# Patient Record
Sex: FEMALE | Race: BLACK OR AFRICAN AMERICAN | NOT HISPANIC OR LATINO | Employment: FULL TIME | URBAN - METROPOLITAN AREA
[De-identification: names, ages, dates, MRNs, and addresses within clinical notes are randomized per-mention and may not be internally consistent; named-entity substitution may affect disease eponyms.]

---

## 2021-02-10 ENCOUNTER — TELEPHONE (OUTPATIENT)
Dept: FAMILY MEDICINE | Facility: CLINIC | Age: 31
End: 2021-02-10

## 2021-02-17 ENCOUNTER — OFFICE VISIT (OUTPATIENT)
Dept: FAMILY MEDICINE | Facility: CLINIC | Age: 31
End: 2021-02-17
Payer: OTHER GOVERNMENT

## 2021-02-17 ENCOUNTER — TELEPHONE (OUTPATIENT)
Dept: FAMILY MEDICINE | Facility: CLINIC | Age: 31
End: 2021-02-17

## 2021-02-17 VITALS
HEIGHT: 65 IN | HEART RATE: 61 BPM | BODY MASS INDEX: 30.49 KG/M2 | WEIGHT: 183 LBS | DIASTOLIC BLOOD PRESSURE: 70 MMHG | SYSTOLIC BLOOD PRESSURE: 110 MMHG

## 2021-02-17 DIAGNOSIS — Z00.00 ROUTINE GENERAL MEDICAL EXAMINATION AT A HEALTH CARE FACILITY: Primary | ICD-10-CM

## 2021-02-17 DIAGNOSIS — D21.9 FIBROIDS: ICD-10-CM

## 2021-02-17 DIAGNOSIS — K21.9 GASTROESOPHAGEAL REFLUX DISEASE, UNSPECIFIED WHETHER ESOPHAGITIS PRESENT: ICD-10-CM

## 2021-02-17 PROCEDURE — 99385 PREV VISIT NEW AGE 18-39: CPT | Mod: S$GLB,,, | Performed by: PHYSICIAN ASSISTANT

## 2021-02-17 PROCEDURE — 99385 PR PREVENTIVE VISIT,NEW,18-39: ICD-10-PCS | Mod: S$GLB,,, | Performed by: PHYSICIAN ASSISTANT

## 2021-02-17 RX ORDER — PANTOPRAZOLE SODIUM 40 MG/1
40 TABLET, DELAYED RELEASE ORAL DAILY
Qty: 30 TABLET | Refills: 11 | Status: SHIPPED | OUTPATIENT
Start: 2021-02-17 | End: 2021-03-29 | Stop reason: SDUPTHER

## 2021-02-20 ENCOUNTER — PATIENT MESSAGE (OUTPATIENT)
Dept: FAMILY MEDICINE | Facility: CLINIC | Age: 31
End: 2021-02-20

## 2021-02-20 LAB
ALBUMIN SERPL-MCNC: 4.8 G/DL (ref 3.6–5.1)
ALBUMIN/GLOB SERPL: 1.7 (CALC) (ref 1–2.5)
ALP SERPL-CCNC: 69 U/L (ref 31–125)
ALT SERPL-CCNC: 14 U/L (ref 6–29)
APPEARANCE UR: CLEAR
AST SERPL-CCNC: 21 U/L (ref 10–30)
BACTERIA #/AREA URNS HPF: ABNORMAL /HPF
BACTERIA UR CULT: ABNORMAL
BACTERIA UR CULT: ABNORMAL
BASOPHILS # BLD AUTO: 32 CELLS/UL (ref 0–200)
BASOPHILS NFR BLD AUTO: 0.4 %
BILIRUB SERPL-MCNC: 0.6 MG/DL (ref 0.2–1.2)
BILIRUB UR QL STRIP: NEGATIVE
BUN SERPL-MCNC: 14 MG/DL (ref 7–25)
BUN/CREAT SERPL: ABNORMAL (CALC) (ref 6–22)
CALCIUM SERPL-MCNC: 10.3 MG/DL (ref 8.6–10.2)
CHLORIDE SERPL-SCNC: 99 MMOL/L (ref 98–110)
CHOLEST SERPL-MCNC: 177 MG/DL
CHOLEST/HDLC SERPL: 3.2 (CALC)
CO2 SERPL-SCNC: 29 MMOL/L (ref 20–32)
COLOR UR: YELLOW
CREAT SERPL-MCNC: 0.86 MG/DL (ref 0.5–1.1)
EOSINOPHIL # BLD AUTO: 40 CELLS/UL (ref 15–500)
EOSINOPHIL NFR BLD AUTO: 0.5 %
ERYTHROCYTE [DISTWIDTH] IN BLOOD BY AUTOMATED COUNT: 12.4 % (ref 11–15)
GFRSERPLBLD MDRD-ARVRAT: 91 ML/MIN/1.73M2
GLOBULIN SER CALC-MCNC: 2.8 G/DL (CALC) (ref 1.9–3.7)
GLUCOSE SERPL-MCNC: 80 MG/DL (ref 65–99)
GLUCOSE UR QL STRIP: NEGATIVE
HCT VFR BLD AUTO: 36.8 % (ref 35–45)
HDLC SERPL-MCNC: 56 MG/DL
HGB BLD-MCNC: 12.6 G/DL (ref 11.7–15.5)
HGB UR QL STRIP: NEGATIVE
HYALINE CASTS #/AREA URNS LPF: ABNORMAL /LPF
KETONES UR QL STRIP: NEGATIVE
LDLC SERPL CALC-MCNC: 102 MG/DL (CALC)
LEUKOCYTE ESTERASE UR QL STRIP: ABNORMAL
LYMPHOCYTES # BLD AUTO: 2007 CELLS/UL (ref 850–3900)
LYMPHOCYTES NFR BLD AUTO: 25.4 %
MCH RBC QN AUTO: 31.6 PG (ref 27–33)
MCHC RBC AUTO-ENTMCNC: 34.2 G/DL (ref 32–36)
MCV RBC AUTO: 92.2 FL (ref 80–100)
MONOCYTES # BLD AUTO: 387 CELLS/UL (ref 200–950)
MONOCYTES NFR BLD AUTO: 4.9 %
NEUTROPHILS # BLD AUTO: 5435 CELLS/UL (ref 1500–7800)
NEUTROPHILS NFR BLD AUTO: 68.8 %
NITRITE UR QL STRIP: NEGATIVE
NONHDLC SERPL-MCNC: 121 MG/DL (CALC)
PH UR STRIP: 5.5 [PH] (ref 5–8)
PLATELET # BLD AUTO: 230 THOUSAND/UL (ref 140–400)
PMV BLD REES-ECKER: 13.1 FL (ref 7.5–12.5)
POTASSIUM SERPL-SCNC: 4.2 MMOL/L (ref 3.5–5.3)
PROT SERPL-MCNC: 7.6 G/DL (ref 6.1–8.1)
PROT UR QL STRIP: NEGATIVE
RBC # BLD AUTO: 3.99 MILLION/UL (ref 3.8–5.1)
RBC #/AREA URNS HPF: ABNORMAL /HPF
SODIUM SERPL-SCNC: 137 MMOL/L (ref 135–146)
SP GR UR STRIP: 1.02 (ref 1–1.03)
SQUAMOUS #/AREA URNS HPF: ABNORMAL /HPF
TRIGL SERPL-MCNC: 99 MG/DL
TSH SERPL-ACNC: 1.42 MIU/L
WBC # BLD AUTO: 7.9 THOUSAND/UL (ref 3.8–10.8)
WBC #/AREA URNS HPF: ABNORMAL /HPF

## 2021-02-22 ENCOUNTER — PATIENT MESSAGE (OUTPATIENT)
Dept: FAMILY MEDICINE | Facility: CLINIC | Age: 31
End: 2021-02-22

## 2021-03-02 ENCOUNTER — PATIENT MESSAGE (OUTPATIENT)
Dept: FAMILY MEDICINE | Facility: CLINIC | Age: 31
End: 2021-03-02

## 2021-03-24 DIAGNOSIS — R10.2 PELVIC AND PERINEAL PAIN: Primary | ICD-10-CM

## 2021-03-25 ENCOUNTER — TELEPHONE (OUTPATIENT)
Dept: FAMILY MEDICINE | Facility: CLINIC | Age: 31
End: 2021-03-25

## 2021-03-29 ENCOUNTER — OFFICE VISIT (OUTPATIENT)
Dept: FAMILY MEDICINE | Facility: CLINIC | Age: 31
End: 2021-03-29
Payer: OTHER GOVERNMENT

## 2021-03-29 VITALS
HEART RATE: 64 BPM | DIASTOLIC BLOOD PRESSURE: 86 MMHG | WEIGHT: 180 LBS | HEIGHT: 65 IN | SYSTOLIC BLOOD PRESSURE: 120 MMHG | BODY MASS INDEX: 29.99 KG/M2

## 2021-03-29 DIAGNOSIS — K21.9 GASTROESOPHAGEAL REFLUX DISEASE, UNSPECIFIED WHETHER ESOPHAGITIS PRESENT: ICD-10-CM

## 2021-03-29 DIAGNOSIS — K21.9 GASTROESOPHAGEAL REFLUX DISEASE, UNSPECIFIED WHETHER ESOPHAGITIS PRESENT: Primary | ICD-10-CM

## 2021-03-29 PROCEDURE — 99213 OFFICE O/P EST LOW 20 MIN: CPT | Mod: S$GLB,,, | Performed by: PHYSICIAN ASSISTANT

## 2021-03-29 PROCEDURE — 99213 PR OFFICE/OUTPT VISIT, EST, LEVL III, 20-29 MIN: ICD-10-PCS | Mod: S$GLB,,, | Performed by: PHYSICIAN ASSISTANT

## 2021-03-29 RX ORDER — PANTOPRAZOLE SODIUM 40 MG/1
40 TABLET, DELAYED RELEASE ORAL DAILY
Qty: 30 TABLET | Refills: 5 | Status: SHIPPED | OUTPATIENT
Start: 2021-03-29 | End: 2021-08-20 | Stop reason: SDUPTHER

## 2021-04-01 ENCOUNTER — TELEPHONE (OUTPATIENT)
Dept: FAMILY MEDICINE | Facility: CLINIC | Age: 31
End: 2021-04-01

## 2021-04-01 ENCOUNTER — PATIENT MESSAGE (OUTPATIENT)
Dept: FAMILY MEDICINE | Facility: CLINIC | Age: 31
End: 2021-04-01

## 2021-04-01 DIAGNOSIS — R87.610 PAP SMEAR ABNORMALITY OF CERVIX WITH ASCUS FAVORING BENIGN: ICD-10-CM

## 2021-04-01 DIAGNOSIS — R87.610 ATYPICAL SQUAMOUS CELL OF UNDETERMINED SIGNIFICANCE OF CERVIX: Primary | ICD-10-CM

## 2021-06-01 ENCOUNTER — OFFICE VISIT (OUTPATIENT)
Dept: FAMILY MEDICINE | Facility: CLINIC | Age: 31
End: 2021-06-01
Payer: OTHER GOVERNMENT

## 2021-06-01 VITALS
WEIGHT: 172 LBS | HEART RATE: 88 BPM | SYSTOLIC BLOOD PRESSURE: 122 MMHG | BODY MASS INDEX: 28.62 KG/M2 | OXYGEN SATURATION: 99 % | DIASTOLIC BLOOD PRESSURE: 80 MMHG

## 2021-06-01 DIAGNOSIS — N34.1 URETHRITIS, NONSPECIFIC: ICD-10-CM

## 2021-06-01 DIAGNOSIS — B37.31 VAGINAL YEAST INFECTION: ICD-10-CM

## 2021-06-01 DIAGNOSIS — Z20.2 POSSIBLE EXPOSURE TO STD: Primary | ICD-10-CM

## 2021-06-01 PROCEDURE — 99213 PR OFFICE/OUTPT VISIT, EST, LEVL III, 20-29 MIN: ICD-10-PCS | Mod: 25,S$GLB,, | Performed by: PHYSICIAN ASSISTANT

## 2021-06-01 PROCEDURE — 96372 PR INJECTION,THERAP/PROPH/DIAG2ST, IM OR SUBCUT: ICD-10-PCS | Mod: S$GLB,,, | Performed by: PHYSICIAN ASSISTANT

## 2021-06-01 PROCEDURE — 99213 OFFICE O/P EST LOW 20 MIN: CPT | Mod: 25,S$GLB,, | Performed by: PHYSICIAN ASSISTANT

## 2021-06-01 PROCEDURE — 96372 THER/PROPH/DIAG INJ SC/IM: CPT | Mod: S$GLB,,, | Performed by: PHYSICIAN ASSISTANT

## 2021-06-01 RX ORDER — DOXYCYCLINE 100 MG/1
100 CAPSULE ORAL 2 TIMES DAILY
Qty: 20 CAPSULE | Refills: 0 | Status: SHIPPED | OUTPATIENT
Start: 2021-06-01 | End: 2021-06-11

## 2021-06-01 RX ORDER — CEFTRIAXONE 500 MG/1
500 INJECTION, POWDER, FOR SOLUTION INTRAMUSCULAR; INTRAVENOUS
Status: DISCONTINUED | OUTPATIENT
Start: 2021-06-01 | End: 2021-06-01

## 2021-06-01 RX ORDER — CEFTRIAXONE 1 G/1
1 INJECTION, POWDER, FOR SOLUTION INTRAMUSCULAR; INTRAVENOUS ONCE
Status: COMPLETED | OUTPATIENT
Start: 2021-06-01 | End: 2021-06-01

## 2021-06-01 RX ADMIN — CEFTRIAXONE 1 G: 1 INJECTION, POWDER, FOR SOLUTION INTRAMUSCULAR; INTRAVENOUS at 04:06

## 2021-06-03 LAB
C TRACH RRNA SPEC QL NAA+PROBE: NOT DETECTED
COMMENT: NORMAL
HIV 1+2 AB+HIV1 P24 AG SERPL QL IA: NORMAL
N GONORRHOEA RRNA SPEC QL NAA+PROBE: NOT DETECTED
RPR SER QL: NORMAL

## 2021-06-04 LAB
HSV1 IGM SER QL IF: NEGATIVE
HSV2 IGM SER QL IF: NEGATIVE

## 2021-08-20 ENCOUNTER — OFFICE VISIT (OUTPATIENT)
Dept: FAMILY MEDICINE | Facility: CLINIC | Age: 31
End: 2021-08-20
Payer: OTHER GOVERNMENT

## 2021-08-20 VITALS
DIASTOLIC BLOOD PRESSURE: 78 MMHG | SYSTOLIC BLOOD PRESSURE: 120 MMHG | BODY MASS INDEX: 29.66 KG/M2 | WEIGHT: 178 LBS | HEART RATE: 80 BPM | HEIGHT: 65 IN

## 2021-08-20 DIAGNOSIS — F39 MOOD DISORDER: ICD-10-CM

## 2021-08-20 DIAGNOSIS — N89.8 VAGINAL DISCHARGE: ICD-10-CM

## 2021-08-20 DIAGNOSIS — K21.9 GASTROESOPHAGEAL REFLUX DISEASE, UNSPECIFIED WHETHER ESOPHAGITIS PRESENT: Primary | ICD-10-CM

## 2021-08-20 PROCEDURE — 99214 OFFICE O/P EST MOD 30 MIN: CPT | Mod: S$GLB,,, | Performed by: PHYSICIAN ASSISTANT

## 2021-08-20 PROCEDURE — 99214 PR OFFICE/OUTPT VISIT, EST, LEVL IV, 30-39 MIN: ICD-10-PCS | Mod: S$GLB,,, | Performed by: PHYSICIAN ASSISTANT

## 2021-08-20 RX ORDER — FLUCONAZOLE 150 MG/1
150 TABLET ORAL DAILY
Qty: 3 TABLET | Refills: 0 | Status: SHIPPED | OUTPATIENT
Start: 2021-08-20 | End: 2021-08-23

## 2021-08-20 RX ORDER — PANTOPRAZOLE SODIUM 40 MG/1
40 TABLET, DELAYED RELEASE ORAL DAILY
Qty: 30 TABLET | Refills: 5 | Status: SHIPPED | OUTPATIENT
Start: 2021-08-20 | End: 2022-08-22 | Stop reason: SDUPTHER

## 2021-08-23 LAB
APPEARANCE UR: CLEAR
BACTERIA #/AREA URNS HPF: ABNORMAL /HPF
BACTERIA UR CULT: ABNORMAL
BILIRUB UR QL STRIP: NEGATIVE
C TRACH RRNA SPEC QL NAA+PROBE: NOT DETECTED
COLOR UR: YELLOW
COMMENT: NORMAL
GLUCOSE UR QL STRIP: NEGATIVE
HGB UR QL STRIP: NEGATIVE
HIV 1+2 AB+HIV1 P24 AG SERPL QL IA: NORMAL
HYALINE CASTS #/AREA URNS LPF: ABNORMAL /LPF
KETONES UR QL STRIP: NEGATIVE
LEUKOCYTE ESTERASE UR QL STRIP: NEGATIVE
N GONORRHOEA RRNA SPEC QL NAA+PROBE: NOT DETECTED
NITRITE UR QL STRIP: NEGATIVE
PH UR STRIP: ABNORMAL [PH] (ref 5–8)
PROT UR QL STRIP: NEGATIVE
RBC #/AREA URNS HPF: ABNORMAL /HPF
RPR SER QL: NORMAL
SP GR UR STRIP: 1.01 (ref 1–1.03)
SQUAMOUS #/AREA URNS HPF: ABNORMAL /HPF
WBC #/AREA URNS HPF: ABNORMAL /HPF

## 2021-09-17 ENCOUNTER — TELEPHONE (OUTPATIENT)
Dept: FAMILY MEDICINE | Facility: CLINIC | Age: 31
End: 2021-09-17

## 2021-10-08 ENCOUNTER — TELEPHONE (OUTPATIENT)
Dept: FAMILY MEDICINE | Facility: CLINIC | Age: 31
End: 2021-10-08

## 2021-10-08 DIAGNOSIS — F39 MOOD DISORDER: Primary | ICD-10-CM

## 2021-12-06 ENCOUNTER — PATIENT MESSAGE (OUTPATIENT)
Dept: FAMILY MEDICINE | Facility: CLINIC | Age: 31
End: 2021-12-06
Payer: OTHER GOVERNMENT

## 2021-12-06 DIAGNOSIS — B37.31 VAGINAL YEAST INFECTION: Primary | ICD-10-CM

## 2021-12-06 RX ORDER — FLUCONAZOLE 150 MG/1
150 TABLET ORAL DAILY
Qty: 3 TABLET | Refills: 0 | Status: SHIPPED | OUTPATIENT
Start: 2021-12-06 | End: 2021-12-09

## 2021-12-28 ENCOUNTER — TELEPHONE (OUTPATIENT)
Dept: FAMILY MEDICINE | Facility: CLINIC | Age: 31
End: 2021-12-28
Payer: OTHER GOVERNMENT

## 2021-12-30 ENCOUNTER — OFFICE VISIT (OUTPATIENT)
Dept: FAMILY MEDICINE | Facility: CLINIC | Age: 31
End: 2021-12-30
Payer: OTHER GOVERNMENT

## 2021-12-30 VITALS
WEIGHT: 184 LBS | SYSTOLIC BLOOD PRESSURE: 108 MMHG | BODY MASS INDEX: 30.66 KG/M2 | HEART RATE: 88 BPM | HEIGHT: 65 IN | DIASTOLIC BLOOD PRESSURE: 60 MMHG

## 2021-12-30 DIAGNOSIS — M25.532 LEFT WRIST PAIN: ICD-10-CM

## 2021-12-30 DIAGNOSIS — I34.1 MVP (MITRAL VALVE PROLAPSE): ICD-10-CM

## 2021-12-30 DIAGNOSIS — R30.0 DYSURIA: Primary | ICD-10-CM

## 2021-12-30 DIAGNOSIS — R00.2 PALPITATIONS: ICD-10-CM

## 2021-12-30 PROCEDURE — 99214 PR OFFICE/OUTPT VISIT, EST, LEVL IV, 30-39 MIN: ICD-10-PCS | Mod: S$GLB,,, | Performed by: PHYSICIAN ASSISTANT

## 2021-12-30 PROCEDURE — 99214 OFFICE O/P EST MOD 30 MIN: CPT | Mod: S$GLB,,, | Performed by: PHYSICIAN ASSISTANT

## 2021-12-31 LAB
ALBUMIN SERPL-MCNC: 4.9 G/DL (ref 3.6–5.1)
ALBUMIN/GLOB SERPL: 1.6 (CALC) (ref 1–2.5)
ALP SERPL-CCNC: 57 U/L (ref 31–125)
ALT SERPL-CCNC: 20 U/L (ref 6–29)
APPEARANCE UR: CLEAR
AST SERPL-CCNC: 26 U/L (ref 10–30)
BACTERIA #/AREA URNS HPF: ABNORMAL /HPF
BACTERIA UR CULT: ABNORMAL
BASOPHILS # BLD AUTO: 44 CELLS/UL (ref 0–200)
BASOPHILS NFR BLD AUTO: 0.5 %
BILIRUB SERPL-MCNC: 0.5 MG/DL (ref 0.2–1.2)
BILIRUB UR QL STRIP: NEGATIVE
BUN SERPL-MCNC: 20 MG/DL (ref 7–25)
BUN/CREAT SERPL: ABNORMAL (CALC) (ref 6–22)
CALCIUM SERPL-MCNC: 10.4 MG/DL (ref 8.6–10.2)
CHLORIDE SERPL-SCNC: 99 MMOL/L (ref 98–110)
CO2 SERPL-SCNC: 29 MMOL/L (ref 20–32)
COLOR UR: YELLOW
CREAT SERPL-MCNC: 0.86 MG/DL (ref 0.5–1.1)
EOSINOPHIL # BLD AUTO: 70 CELLS/UL (ref 15–500)
EOSINOPHIL NFR BLD AUTO: 0.8 %
ERYTHROCYTE [DISTWIDTH] IN BLOOD BY AUTOMATED COUNT: 12.7 % (ref 11–15)
GLOBULIN SER CALC-MCNC: 3 G/DL (CALC) (ref 1.9–3.7)
GLUCOSE SERPL-MCNC: 79 MG/DL (ref 65–99)
GLUCOSE UR QL STRIP: ABNORMAL
HCT VFR BLD AUTO: 38.5 % (ref 35–45)
HGB BLD-MCNC: 12.9 G/DL (ref 11.7–15.5)
HGB UR QL STRIP: NEGATIVE
HIV 1+2 AB+HIV1 P24 AG SERPL QL IA: NORMAL
HYALINE CASTS #/AREA URNS LPF: ABNORMAL /LPF
KETONES UR QL STRIP: NEGATIVE
LEUKOCYTE ESTERASE UR QL STRIP: NEGATIVE
LYMPHOCYTES # BLD AUTO: 2306 CELLS/UL (ref 850–3900)
LYMPHOCYTES NFR BLD AUTO: 26.2 %
MCH RBC QN AUTO: 31.1 PG (ref 27–33)
MCHC RBC AUTO-ENTMCNC: 33.5 G/DL (ref 32–36)
MCV RBC AUTO: 92.8 FL (ref 80–100)
MONOCYTES # BLD AUTO: 466 CELLS/UL (ref 200–950)
MONOCYTES NFR BLD AUTO: 5.3 %
NEUTROPHILS # BLD AUTO: 5914 CELLS/UL (ref 1500–7800)
NEUTROPHILS NFR BLD AUTO: 67.2 %
NITRITE UR QL STRIP: NEGATIVE
PH UR STRIP: 6 [PH] (ref 5–8)
PLATELET # BLD AUTO: 231 THOUSAND/UL (ref 140–400)
PMV BLD REES-ECKER: 11.9 FL (ref 7.5–12.5)
POTASSIUM SERPL-SCNC: 4.2 MMOL/L (ref 3.5–5.3)
PROT SERPL-MCNC: 7.9 G/DL (ref 6.1–8.1)
PROT UR QL STRIP: NEGATIVE
RBC # BLD AUTO: 4.15 MILLION/UL (ref 3.8–5.1)
RBC #/AREA URNS HPF: ABNORMAL /HPF
RPR SER QL: NORMAL
SODIUM SERPL-SCNC: 136 MMOL/L (ref 135–146)
SP GR UR STRIP: 1.02 (ref 1–1.03)
SQUAMOUS #/AREA URNS HPF: ABNORMAL /HPF
TSH SERPL-ACNC: 1.97 MIU/L
WBC # BLD AUTO: 8.8 THOUSAND/UL (ref 3.8–10.8)
WBC #/AREA URNS HPF: ABNORMAL /HPF

## 2022-01-03 ENCOUNTER — PATIENT MESSAGE (OUTPATIENT)
Dept: FAMILY MEDICINE | Facility: CLINIC | Age: 32
End: 2022-01-03
Payer: OTHER GOVERNMENT

## 2022-02-21 ENCOUNTER — OFFICE VISIT (OUTPATIENT)
Dept: FAMILY MEDICINE | Facility: CLINIC | Age: 32
End: 2022-02-21
Payer: OTHER GOVERNMENT

## 2022-02-21 VITALS
HEART RATE: 80 BPM | DIASTOLIC BLOOD PRESSURE: 62 MMHG | BODY MASS INDEX: 30.16 KG/M2 | WEIGHT: 181 LBS | OXYGEN SATURATION: 98 % | SYSTOLIC BLOOD PRESSURE: 100 MMHG | HEIGHT: 65 IN

## 2022-02-21 DIAGNOSIS — R81 GLUCOSURIA WITH NORMAL SERUM GLUCOSE: ICD-10-CM

## 2022-02-21 DIAGNOSIS — M25.532 LEFT WRIST PAIN: ICD-10-CM

## 2022-02-21 DIAGNOSIS — E83.52 SERUM CALCIUM ELEVATED: ICD-10-CM

## 2022-02-21 DIAGNOSIS — J02.9 ACUTE VIRAL PHARYNGITIS: Primary | ICD-10-CM

## 2022-02-21 DIAGNOSIS — K21.9 GASTROESOPHAGEAL REFLUX DISEASE, UNSPECIFIED WHETHER ESOPHAGITIS PRESENT: ICD-10-CM

## 2022-02-21 PROCEDURE — 99395 PR PREVENTIVE VISIT,EST,18-39: ICD-10-PCS | Mod: S$GLB,,, | Performed by: PHYSICIAN ASSISTANT

## 2022-02-21 PROCEDURE — 99395 PREV VISIT EST AGE 18-39: CPT | Mod: S$GLB,,, | Performed by: PHYSICIAN ASSISTANT

## 2022-02-21 NOTE — PROGRESS NOTES
SUBJECTIVE:    Patient ID: Estrella Corrales is a 31 y.o. female.    Chief Complaint: Annual Exam (Annual wellness exam//no med bottles//tc)    Patient is a 30 y/o female here today for an annual exam. Today she reports feeling mildly under the weather with symptoms including a sore throat, congestion, clear rhinorrhea, productive cough, sneezing. This has been going on for a week. Feels this is related to the weather changes. No sick contacts. No fever, sinus pressure/pain. Feels her acid reflux has been exacerbated recently b/c she start the keto diet. Has not been taking her protonix daily. Previous visit she was having carpal tunnel pain, thumb spica splint has managed this very well.     Worried about glucose in urine and minimally elevated calcium on CMP. Reassured patient, will recheck labs in 6 months..           Office Visit on 12/30/2021   Component Date Value Ref Range Status    Color, UA 12/30/2021 YELLOW  YELLOW Final    Appearance, UA 12/30/2021 CLEAR  CLEAR Final    Specific [place], UA 12/30/2021 1.017  1.001 - 1.035 Final    pH, UA 12/30/2021 6.0  5.0 - 8.0 Final    Glucose, UA 12/30/2021 1+ (A) NEGATIVE Final    Bilirubin, UA 12/30/2021 NEGATIVE  NEGATIVE Final    Ketones, UA 12/30/2021 NEGATIVE  NEGATIVE Final    Occult Blood UA 12/30/2021 NEGATIVE  NEGATIVE Final    Protein, UA 12/30/2021 NEGATIVE  NEGATIVE Final    Nitrite, UA 12/30/2021 NEGATIVE  NEGATIVE Final    Leukocytes, UA 12/30/2021 NEGATIVE  NEGATIVE Final    WBC Casts, UA 12/30/2021 NONE SEEN  < OR = 5 /HPF Final    RBC Casts, UA 12/30/2021 NONE SEEN  < OR = 2 /HPF Final    Squam Epithel, UA 12/30/2021 NONE SEEN  < OR = 5 /HPF Final    Bacteria, UA 12/30/2021 NONE SEEN  NONE SEEN /HPF Final    Hyaline Casts, UA 12/30/2021 NONE SEEN  NONE SEEN /LPF Final    Reflexive Urine Culture 12/30/2021    Final    HIV Ag/Ab 4th Gen 12/30/2021 NON-REACTIVE  NON-REACTIVE Final    WBC 12/30/2021 8.8  3.8 - 10.8 Thousand/uL Final     RBC 12/30/2021 4.15  3.80 - 5.10 Million/uL Final    Hemoglobin 12/30/2021 12.9  11.7 - 15.5 g/dL Final    Hematocrit 12/30/2021 38.5  35.0 - 45.0 % Final    MCV 12/30/2021 92.8  80.0 - 100.0 fL Final    MCH 12/30/2021 31.1  27.0 - 33.0 pg Final    MCHC 12/30/2021 33.5  32.0 - 36.0 g/dL Final    RDW 12/30/2021 12.7  11.0 - 15.0 % Final    Platelets 12/30/2021 231  140 - 400 Thousand/uL Final    MPV 12/30/2021 11.9  7.5 - 12.5 fL Final    Neutrophils, Abs 12/30/2021 5,914  1,500 - 7,800 cells/uL Final    Lymph # 12/30/2021 2,306  850 - 3,900 cells/uL Final    Mono # 12/30/2021 466  200 - 950 cells/uL Final    Eos # 12/30/2021 70  15 - 500 cells/uL Final    Baso # 12/30/2021 44  0 - 200 cells/uL Final    Neutrophils Relative 12/30/2021 67.2  % Final    Lymph % 12/30/2021 26.2  % Final    Mono % 12/30/2021 5.3  % Final    Eosinophil % 12/30/2021 0.8  % Final    Basophil % 12/30/2021 0.5  % Final    Glucose 12/30/2021 79  65 - 99 mg/dL Final    BUN 12/30/2021 20  7 - 25 mg/dL Final    Creatinine 12/30/2021 0.86  0.50 - 1.10 mg/dL Final    eGFR if non African American 12/30/2021 90  > OR = 60 mL/min/1.73m2 Final    eGFR if  12/30/2021 104  > OR = 60 mL/min/1.73m2 Final    BUN/Creatinine Ratio 12/30/2021 NOT APPLICABLE  6 - 22 (calc) Final    Sodium 12/30/2021 136  135 - 146 mmol/L Final    Potassium 12/30/2021 4.2  3.5 - 5.3 mmol/L Final    Chloride 12/30/2021 99  98 - 110 mmol/L Final    CO2 12/30/2021 29  20 - 32 mmol/L Final    Calcium 12/30/2021 10.4 (A) 8.6 - 10.2 mg/dL Final    Total Protein 12/30/2021 7.9  6.1 - 8.1 g/dL Final    Albumin 12/30/2021 4.9  3.6 - 5.1 g/dL Final    Globulin, Total 12/30/2021 3.0  1.9 - 3.7 g/dL (calc) Final    Albumin/Globulin Ratio 12/30/2021 1.6  1.0 - 2.5 (calc) Final    Total Bilirubin 12/30/2021 0.5  0.2 - 1.2 mg/dL Final    Alkaline Phosphatase 12/30/2021 57  31 - 125 U/L Final    AST 12/30/2021 26  10 - 30 U/L Final    ALT  "12/30/2021 20  6 - 29 U/L Final    TSH w/reflex to FT4 12/30/2021 1.97  mIU/L Final    RPR (Dx) w/Refl Titer and Confirma* 12/30/2021 NON-REACTIVE  NON-REACTIVE Final       Past Medical History:   Diagnosis Date    GERD (gastroesophageal reflux disease)      History reviewed. No pertinent surgical history.  Family History   Problem Relation Age of Onset    Hypertension Mother     Kidney disease Mother     Cancer Maternal Grandmother        Marital Status: Single  Alcohol History:  reports current alcohol use of about 1.0 standard drink of alcohol per week.  Tobacco History:  reports that she has never smoked. She has never used smokeless tobacco.  Drug History:  reports no history of drug use.    Review of patient's allergies indicates:  No Known Allergies    Current Outpatient Medications:     pantoprazole (PROTONIX) 40 MG tablet, Take 1 tablet (40 mg total) by mouth once daily., Disp: 30 tablet, Rfl: 5    Review of Systems   Constitutional: Negative for activity change, fatigue, fever and unexpected weight change.   HENT: Positive for congestion, rhinorrhea, sneezing and sore throat. Negative for postnasal drip, sinus pressure and sinus pain.    Respiratory: Negative for apnea, cough, chest tightness and shortness of breath.    Cardiovascular: Negative for chest pain and palpitations.   Gastrointestinal: Negative for abdominal distention and abdominal pain.   Genitourinary: Negative for difficulty urinating and dyspareunia.   Musculoskeletal: Negative for arthralgias and back pain.   Neurological: Negative for dizziness, weakness and headaches.          Objective:      Vitals:    02/21/22 0846   BP: 100/62   Pulse: 80   SpO2: 98%   Weight: 82.1 kg (181 lb)   Height: 5' 5" (1.651 m)     Physical Exam  Constitutional:       General: She is not in acute distress.  HENT:      Head: Normocephalic and atraumatic.      Mouth/Throat:      Comments: No tonsillar exudates. No posterior pharyngitis erythema.  Eyes:    "   Pupils: Pupils are equal, round, and reactive to light.   Cardiovascular:      Rate and Rhythm: Normal rate and regular rhythm.      Heart sounds: Normal heart sounds.   Pulmonary:      Effort: Pulmonary effort is normal.      Breath sounds: Normal breath sounds.   Abdominal:      General: Bowel sounds are normal. There is no distension.      Palpations: Abdomen is soft.      Tenderness: There is no abdominal tenderness.   Musculoskeletal:         General: Normal range of motion.      Cervical back: Normal range of motion and neck supple.   Skin:     General: Skin is warm and dry.      Findings: No erythema or rash.   Neurological:      Mental Status: She is alert and oriented to person, place, and time.      Cranial Nerves: No cranial nerve deficit.           Assessment:       1. Acute viral pharyngitis    2. Left wrist pain    3. Gastroesophageal reflux disease, unspecified whether esophagitis present    4. Glucosuria with normal serum glucose    5. Serum calcium elevated         Plan:       Acute viral pharyngitis  Comments:  No signs of bacterial infection on physical exam. Rest, fluids and OTC NSAIDS prn. Call if symptoms persist or worsen.    Left wrist pain  Comments:  Carpal tunnel pain vs. overuse tendinitis. This has been well managed with thumb spica splint. Continue as is.    Gastroesophageal reflux disease, unspecified whether esophagitis present  Comments:  Exacerbated recently with keto diet. If symptoms persist daily protonix encouraged.    Glucosuria with normal serum glucose  Comments:  No concern for DM, GFR, BUN and creatinine all wnl. Will recheck levels in 6 months.    Serum calcium elevated  Comments:  Minimally elevated at 10.4. Will recheck in 6 months and possibly check PTH if this persists.      Follow up in about 6 months (around 8/21/2022) for checkup with lab (u/a, PTH and cmp).        2/21/2022 Tomas Camacho PA-C

## 2022-04-27 ENCOUNTER — TELEPHONE (OUTPATIENT)
Dept: FAMILY MEDICINE | Facility: CLINIC | Age: 32
End: 2022-04-27

## 2022-04-27 NOTE — TELEPHONE ENCOUNTER
----- Message from Katarina Rothman sent at 4/27/2022 11:33 AM CDT -----  Patient called and stated that she has had severe constipation for the last two weeks and over the counter medicine is not working she would like to come in soon to see tim please give her a call at [phone]

## 2022-04-27 NOTE — TELEPHONE ENCOUNTER
"Spoke to pt. She is c/o constipation x 2 weeks. Pt last BM was 4 days ago, But she states it was "not a good one" she has used enemas and drank a whole bottles of Mag citrate. Pt wants to be seen this week, but no appts. Please advise. No severe stomach pain. Only c/o bloating.    "

## 2022-04-27 NOTE — TELEPHONE ENCOUNTER
Add metamucil, high fiber supplement daily, If she is still not having regular stools into next week we can see her.

## 2022-06-22 ENCOUNTER — TELEPHONE (OUTPATIENT)
Dept: FAMILY MEDICINE | Facility: CLINIC | Age: 32
End: 2022-06-22

## 2022-06-22 NOTE — TELEPHONE ENCOUNTER
"----- Message from Angelica Ama sent at 6/22/2022  3:08 PM CDT -----  Pt called wanted to move her appt up said she is having "kidney and leg pain with chest burning" I offered several options she declined said she would keep her 8/22 as is.  NO cb requested FYI only     "

## 2022-06-22 NOTE — TELEPHONE ENCOUNTER
Pt stated she have been having leg pain and chest burning . Not sure if it cause from juicing. Pt want first  appt with anyone. Scheduled pt with Demarc[facility] on 06/29/2022

## 2022-08-22 ENCOUNTER — OFFICE VISIT (OUTPATIENT)
Dept: FAMILY MEDICINE | Facility: CLINIC | Age: 32
End: 2022-08-22
Payer: OTHER GOVERNMENT

## 2022-08-22 VITALS
HEIGHT: 65 IN | BODY MASS INDEX: 30.99 KG/M2 | OXYGEN SATURATION: 100 % | WEIGHT: 186 LBS | DIASTOLIC BLOOD PRESSURE: 72 MMHG | HEART RATE: 78 BPM | SYSTOLIC BLOOD PRESSURE: 112 MMHG

## 2022-08-22 DIAGNOSIS — F43.10 PTSD (POST-TRAUMATIC STRESS DISORDER): ICD-10-CM

## 2022-08-22 DIAGNOSIS — K21.9 GASTROESOPHAGEAL REFLUX DISEASE, UNSPECIFIED WHETHER ESOPHAGITIS PRESENT: ICD-10-CM

## 2022-08-22 DIAGNOSIS — Z11.3 SCREENING EXAMINATION FOR STD (SEXUALLY TRANSMITTED DISEASE): ICD-10-CM

## 2022-08-22 DIAGNOSIS — Z11.3 SCREENING EXAMINATION FOR STD (SEXUALLY TRANSMITTED DISEASE): Primary | ICD-10-CM

## 2022-08-22 DIAGNOSIS — H93.19 TINNITUS, UNSPECIFIED LATERALITY: Primary | ICD-10-CM

## 2022-08-22 PROCEDURE — 99214 PR OFFICE/OUTPT VISIT, EST, LEVL IV, 30-39 MIN: ICD-10-PCS | Mod: S$GLB,,, | Performed by: PHYSICIAN ASSISTANT

## 2022-08-22 PROCEDURE — 99214 OFFICE O/P EST MOD 30 MIN: CPT | Mod: S$GLB,,, | Performed by: PHYSICIAN ASSISTANT

## 2022-08-22 RX ORDER — PANTOPRAZOLE SODIUM 40 MG/1
40 TABLET, DELAYED RELEASE ORAL DAILY
Qty: 30 TABLET | Refills: 5 | Status: SHIPPED | OUTPATIENT
Start: 2022-08-22 | End: 2023-01-10 | Stop reason: SDUPTHER

## 2022-08-22 NOTE — PROGRESS NOTES
SUBJECTIVE:    Patient ID: Estrella Corrales is a 31 y.o. female.    Chief Complaint: Follow-up (6 MO F/U//no med bottles//ringing in ears and foggy headaches for about a month//tcc)    This is a 31-year-old female who presents today for six-month follow-up.  Just treated for GERD with Protonix once daily.  Reports doing well overall. Her main concern today is regarding ringing in her ears. She reports some hearing loss and slight dizziness at times as well. 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  Has not had any recent evaluation. Compliant with her PPI and reports good effect.      No visits with results within 6 Month(s) from this visit.   Latest known visit with results is:   Office Visit on 12/30/2021   Component Date Value Ref Range Status    Color, UA 12/30/2021 YELLOW  YELLOW Final    Appearance, UA 12/30/2021 CLEAR  CLEAR Final    Specific [place], UA 12/30/2021 1.017  1.001 - 1.035 Final    pH, UA 12/30/2021 6.0  5.0 - 8.0 Final    Glucose, UA 12/30/2021 1+ (A) NEGATIVE Final    Bilirubin, UA 12/30/2021 NEGATIVE  NEGATIVE Final    Ketones, UA 12/30/2021 NEGATIVE  NEGATIVE Final    Occult Blood UA 12/30/2021 NEGATIVE  NEGATIVE Final    Protein, UA 12/30/2021 NEGATIVE  NEGATIVE Final    Nitrite, UA 12/30/2021 NEGATIVE  NEGATIVE Final    Leukocytes, UA 12/30/2021 NEGATIVE  NEGATIVE Final    WBC Casts, UA 12/30/2021 NONE SEEN  < OR = 5 /HPF Final    RBC Casts, UA 12/30/2021 NONE SEEN  < OR = 2 /HPF Final    Squam Epithel, UA 12/30/2021 NONE SEEN  < OR = 5 /HPF Final    Bacteria, UA 12/30/2021 NONE SEEN  NONE SEEN /HPF Final    Hyaline Casts, UA 12/30/2021 NONE SEEN  NONE SEEN /LPF Final    Reflexive Urine Culture 12/30/2021    Final    HIV Ag/Ab 4th Gen 12/30/2021 NON-REACTIVE  NON-REACTIVE Final    WBC 12/30/2021 8.8  3.8 - 10.8 Thousand/uL Final    RBC 12/30/2021 4.15  3.80 - 5.10 Million/uL Final    Hemoglobin 12/30/2021 12.9   11.7 - 15.5 g/dL Final    Hematocrit 12/30/2021 38.5  35.0 - 45.0 % Final    MCV 12/30/2021 92.8  80.0 - 100.0 fL Final    MCH 12/30/2021 31.1  27.0 - 33.0 pg Final    MCHC 12/30/2021 33.5  32.0 - 36.0 g/dL Final    RDW 12/30/2021 12.7  11.0 - 15.0 % Final    Platelets 12/30/2021 231  140 - 400 Thousand/uL Final    MPV 12/30/2021 11.9  7.5 - 12.5 fL Final    Neutrophils, Abs 12/30/2021 5,914  1,500 - 7,800 cells/uL Final    Lymph # 12/30/2021 2,306  850 - 3,900 cells/uL Final    Mono # 12/30/2021 466  200 - 950 cells/uL Final    Eos # 12/30/2021 70  15 - 500 cells/uL Final    Baso # 12/30/2021 44  0 - 200 cells/uL Final    Neutrophils Relative 12/30/2021 67.2  % Final    Lymph % 12/30/2021 26.2  % Final    Mono % 12/30/2021 5.3  % Final    Eosinophil % 12/30/2021 0.8  % Final    Basophil % 12/30/2021 0.5  % Final    Glucose 12/30/2021 79  65 - 99 mg/dL Final    BUN 12/30/2021 20  7 - 25 mg/dL Final    Creatinine 12/30/2021 0.86  0.50 - 1.10 mg/dL Final    eGFR if non African American 12/30/2021 90  > OR = 60 mL/min/1.73m2 Final    eGFR if  12/30/2021 104  > OR = 60 mL/min/1.73m2 Final    BUN/Creatinine Ratio 12/30/2021 NOT APPLICABLE  6 - 22 (calc) Final    Sodium 12/30/2021 136  135 - 146 mmol/L Final    Potassium 12/30/2021 4.2  3.5 - 5.3 mmol/L Final    Chloride 12/30/2021 99  98 - 110 mmol/L Final    CO2 12/30/2021 29  20 - 32 mmol/L Final    Calcium 12/30/2021 10.4 (A) 8.6 - 10.2 mg/dL Final    Total Protein 12/30/2021 7.9  6.1 - 8.1 g/dL Final    Albumin 12/30/2021 4.9  3.6 - 5.1 g/dL Final    Globulin, Total 12/30/2021 3.0  1.9 - 3.7 g/dL (calc) Final    Albumin/Globulin Ratio 12/30/2021 1.6  1.0 - 2.5 (calc) Final    Total Bilirubin 12/30/2021 0.5  0.2 - 1.2 mg/dL Final    Alkaline Phosphatase 12/30/2021 57  31 - 125 U/L Final    AST 12/30/2021 26  10 - 30 U/L Final    ALT 12/30/2021 20  6 - 29 U/L Final    TSH w/reflex to FT4 12/30/2021 1.97  mIU/L Final  "   RPR (Dx) w/Refl Titer and Confirma* 12/30/2021 NON-REACTIVE  NON-REACTIVE Final       Past Medical History:   Diagnosis Date    GERD (gastroesophageal reflux disease)      No past surgical history on file.  Family History   Problem Relation Age of Onset    Hypertension Mother     Kidney disease Mother     Cancer Maternal Grandmother        Marital Status: Single  Alcohol History:  reports current alcohol use of about 1.0 standard drink of alcohol per week.  Tobacco History:  reports that she has never smoked. She has never used smokeless tobacco.  Drug History:  reports no history of drug use.    Review of patient's allergies indicates:  No Known Allergies    Current Outpatient Medications:     pantoprazole (PROTONIX) 40 MG tablet, Take 1 tablet (40 mg total) by mouth once daily., Disp: 30 tablet, Rfl: 5    Review of Systems   Constitutional: Negative for appetite change, chills, fatigue, fever and unexpected weight change.   HENT: Positive for hearing loss and tinnitus. Negative for congestion, drooling, ear discharge, ear pain, rhinorrhea, sinus pain, sneezing, trouble swallowing and voice change.    Respiratory: Negative for cough, chest tightness and shortness of breath.    Cardiovascular: Negative for chest pain and palpitations.   Gastrointestinal: Negative for abdominal distention and abdominal pain.   Endocrine: Negative for cold intolerance and heat intolerance.   Genitourinary: Negative for difficulty urinating and dysuria.   Musculoskeletal: Negative for arthralgias and back pain.   Neurological: Negative for dizziness, weakness and headaches.          Objective:      Vitals:    08/22/22 0844   BP: 112/72   Pulse: 78   SpO2: 100%   Weight: 84.4 kg (186 lb)   Height: 5' 5" (1.651 m)     Physical Exam  Constitutional:       General: She is not in acute distress.     Appearance: She is well-developed.   HENT:      Head: Normocephalic and atraumatic.   Eyes:      Conjunctiva/sclera: Conjunctivae " normal.      Pupils: Pupils are equal, round, and reactive to light.   Neck:      Thyroid: No thyromegaly.   Cardiovascular:      Rate and Rhythm: Normal rate and regular rhythm.      Heart sounds: Normal heart sounds.   Pulmonary:      Effort: Pulmonary effort is normal.      Breath sounds: Normal breath sounds.   Abdominal:      General: Bowel sounds are normal. There is no distension.      Palpations: Abdomen is soft.      Tenderness: There is no abdominal tenderness.   Musculoskeletal:         General: Normal range of motion.      Cervical back: Normal range of motion and neck supple.   Skin:     General: Skin is warm and dry.      Findings: No erythema.   Neurological:      Mental Status: She is alert and oriented to person, place, and time.      Cranial Nerves: No cranial nerve deficit.           Assessment:       1. Tinnitus, unspecified laterality    2. Gastroesophageal reflux disease, unspecified whether esophagitis present    3. Screening examination for STD (sexually transmitted disease)    4. PTSD (post-traumatic stress disorder)         Plan:       Tinnitus, unspecified laterality  Comments:  coupled with hearing loss and having been overseas in [facility] warrants workup.  Orders:  -     Ambulatory referral/consult to ENT; Future; Expected date: 08/22/2022    Gastroesophageal reflux disease, unspecified whether esophagitis present  Comments:  refills as needed. Doing well.  Orders:  -     pantoprazole (PROTONIX) 40 MG tablet; Take 1 tablet (40 mg total) by mouth once daily.  Dispense: 30 tablet; Refill: 5    Screening examination for STD (sexually transmitted disease)  Comments:  recent new sexual partner. wishes for testing.  Orders:  -     HIV 1/2 Ag/Ab (4th Gen); Future; Expected date: 08/22/2022  -     Hepatitis C Antibody; Future; Expected date: 08/22/2022  -     RPR; Future; Expected date: 08/22/2022  -     SureSwab(R)Chlamydia/N.Gonorrhoeae RNA,TMA    PTSD (post-traumatic stress  disorder)  Comments:  wishes for referral for psychologist to discuss her feelings and begin dedicated therapy for this.  Orders:  -     Ambulatory referral/consult to Psychology; Future; Expected date: 08/22/2022      Follow up in about 4 months (around 12/22/2022).        8/22/2022 Tomas Camacho PA-C

## 2022-08-23 LAB
C TRACH RRNA SPEC QL NAA+PROBE: NOT DETECTED
COMMENT: NORMAL
HCV AB S/CO SERPL IA: 0.17
HCV AB SERPL QL IA: NORMAL
HIV 1+2 AB+HIV1 P24 AG SERPL QL IA: NORMAL
N GONORRHOEA RRNA SPEC QL NAA+PROBE: NOT DETECTED
RPR SER QL: NORMAL

## 2022-12-23 ENCOUNTER — TELEPHONE (OUTPATIENT)
Dept: FAMILY MEDICINE | Facility: CLINIC | Age: 32
End: 2022-12-23

## 2023-01-03 ENCOUNTER — TELEPHONE (OUTPATIENT)
Dept: FAMILY MEDICINE | Facility: CLINIC | Age: 33
End: 2023-01-03

## 2023-01-03 NOTE — TELEPHONE ENCOUNTER
----- Message from Trice Gaona sent at 1/3/2023  3:27 PM CST -----  Pt would like to reschedule appt. [phone]

## 2023-01-10 ENCOUNTER — OFFICE VISIT (OUTPATIENT)
Dept: FAMILY MEDICINE | Facility: CLINIC | Age: 33
End: 2023-01-10
Payer: OTHER GOVERNMENT

## 2023-01-10 VITALS
HEART RATE: 75 BPM | SYSTOLIC BLOOD PRESSURE: 110 MMHG | OXYGEN SATURATION: 99 % | DIASTOLIC BLOOD PRESSURE: 62 MMHG | WEIGHT: 188 LBS | HEIGHT: 65 IN | BODY MASS INDEX: 31.32 KG/M2

## 2023-01-10 DIAGNOSIS — Z11.3 SCREENING FOR STD (SEXUALLY TRANSMITTED DISEASE): ICD-10-CM

## 2023-01-10 DIAGNOSIS — Z00.00 ROUTINE PHYSICAL EXAMINATION: ICD-10-CM

## 2023-01-10 DIAGNOSIS — K21.9 GASTROESOPHAGEAL REFLUX DISEASE, UNSPECIFIED WHETHER ESOPHAGITIS PRESENT: ICD-10-CM

## 2023-01-10 DIAGNOSIS — K29.50 CHRONIC GASTRITIS WITHOUT BLEEDING, UNSPECIFIED GASTRITIS TYPE: Primary | ICD-10-CM

## 2023-01-10 PROCEDURE — 99214 OFFICE O/P EST MOD 30 MIN: CPT | Mod: S$GLB,,, | Performed by: PHYSICIAN ASSISTANT

## 2023-01-10 PROCEDURE — 99214 PR OFFICE/OUTPT VISIT, EST, LEVL IV, 30-39 MIN: ICD-10-PCS | Mod: S$GLB,,, | Performed by: PHYSICIAN ASSISTANT

## 2023-01-10 RX ORDER — PANTOPRAZOLE SODIUM 40 MG/1
40 TABLET, DELAYED RELEASE ORAL DAILY
Qty: 30 TABLET | Refills: 5 | Status: SHIPPED | OUTPATIENT
Start: 2023-01-10 | End: 2023-03-23 | Stop reason: SDUPTHER

## 2023-01-10 RX ORDER — SUCRALFATE 1 G/1
1 TABLET ORAL 2 TIMES DAILY
Qty: 60 TABLET | Refills: 0 | Status: SHIPPED | OUTPATIENT
Start: 2023-01-10 | End: 2023-02-09

## 2023-01-10 NOTE — PROGRESS NOTES
SUBJECTIVE:    Patient ID: Estrella Corrales is a 32 y.o. female.    Chief Complaint: 4 month f/u (No bottles/ Ringing ears is getting better not as much since her last visit)    This is a 31 yo female who presents today for regular checkup. She reports that she was in good state of health until recently. She is finding that she is having more acid reflux, actually had episode of dry heaving with meal last night. Does report that she did stop the PPI back in Sept. EGD from 2021 with moderate gastritis.       Office Visit on 08/22/2022   Component Date Value Ref Range Status    HIV Ag/Ab 4th Gen 08/22/2022 NON-REACTIVE  NON-REACTIVE Final    Hepatitis C Ab 08/22/2022 NON-REACTIVE  NON-REACTIVE Final    Signal/Cutoff 08/22/2022 0.17  <1.00 Final    Chlamydia Trachomatis RNA, TMA, Ur* 08/22/2022 NOT DETECTED  NOT DETECTED Final    Neisseria Gonorrhoeae RNA, TMA, Ur* 08/22/2022 NOT DETECTED  NOT DETECTED Final    Comment 08/22/2022    Final    RPR (Dx) w/Refl Titer and Confirma* 08/22/2022 NON-REACTIVE  NON-REACTIVE Final       Past Medical History:   Diagnosis Date    GERD (gastroesophageal reflux disease)      No past surgical history on file.  Family History   Problem Relation Age of Onset    Hypertension Mother     Kidney disease Mother     Cancer Maternal Grandmother        Marital Status: Single  Alcohol History:  reports current alcohol use of about 1.0 standard drink per week.  Tobacco History:  reports that she has never smoked. She has never used smokeless tobacco.  Drug History:  reports no history of drug use.    Review of patient's allergies indicates:  No Known Allergies    Current Outpatient Medications:     pantoprazole (PROTONIX) 40 MG tablet, Take 1 tablet (40 mg total) by mouth once daily., Disp: 30 tablet, Rfl: 5    sucralfate (CARAFATE) 1 gram tablet, Take 1 tablet (1 g total) by mouth 2 (two) times daily., Disp: 60 tablet, Rfl: 0    Review of Systems   Constitutional:  Negative for appetite change,  "chills, fatigue, fever and unexpected weight change.   HENT:  Negative for congestion.    Respiratory:  Negative for cough, chest tightness and shortness of breath.    Cardiovascular:  Negative for chest pain and palpitations.   Gastrointestinal:  Negative for abdominal distention and abdominal pain.   Endocrine: Negative for cold intolerance and heat intolerance.   Genitourinary:  Negative for difficulty urinating and dysuria.   Musculoskeletal:  Negative for arthralgias and back pain.   Neurological:  Negative for dizziness, weakness and headaches.        Objective:      Vitals:    01/10/23 1027   BP: 110/62   Pulse: 75   SpO2: 99%   Weight: 85.3 kg (188 lb)   Height: 5' 5" (1.651 m)     Physical Exam  Constitutional:       General: She is not in acute distress.     Appearance: She is well-developed.   HENT:      Head: Normocephalic and atraumatic.   Eyes:      Conjunctiva/sclera: Conjunctivae normal.      Pupils: Pupils are equal, round, and reactive to light.   Neck:      Thyroid: No thyromegaly.   Cardiovascular:      Rate and Rhythm: Normal rate and regular rhythm.      Heart sounds: Normal heart sounds.   Pulmonary:      Effort: Pulmonary effort is normal.      Breath sounds: Normal breath sounds.   Abdominal:      General: Bowel sounds are normal. There is no distension.      Palpations: Abdomen is soft.      Tenderness: There is no abdominal tenderness.   Musculoskeletal:         General: Normal range of motion.      Cervical back: Normal range of motion and neck supple.   Skin:     General: Skin is warm and dry.      Findings: No erythema.   Neurological:      Mental Status: She is alert and oriented to person, place, and time.      Cranial Nerves: No cranial nerve deficit.         Assessment:       1. Chronic gastritis without bleeding, unspecified gastritis type    2. Routine physical examination    3. Screening for STD (sexually transmitted disease)    4. Gastroesophageal reflux disease, unspecified " whether esophagitis present         Plan:       Chronic gastritis without bleeding, unspecified gastritis type  Comments:  suspect that she may have a PUD presentation here. will start carafate/PPI back. f/u in 6-8 wks.  Orders:  -     sucralfate (CARAFATE) 1 gram tablet; Take 1 tablet (1 g total) by mouth 2 (two) times daily.  Dispense: 60 tablet; Refill: 0  -     pantoprazole (PROTONIX) 40 MG tablet; Take 1 tablet (40 mg total) by mouth once daily.  Dispense: 30 tablet; Refill: 5    Routine physical examination  Comments:  Labs today for ongoing pt care. will get regular labs and STD testing.  Orders:  -     CBC Auto Differential; Future; Expected date: 01/10/2023  -     Comprehensive Metabolic Panel; Future; Expected date: 01/10/2023  -     Lipid Panel; Future; Expected date: 01/10/2023  -     TSH w/reflex to FT4; Future; Expected date: 01/10/2023  -     Urinalysis, Reflex to Urine Culture Urine, Clean Catch    Screening for STD (sexually transmitted disease)  Comments:  check labs today as she did have unprotected encounter since last testing  Orders:  -     SureSwab(R)Chlamydia/N.Gonorrhoeae RNA,TMA  -     HIV 1/2 Ag/Ab (4th Gen); Future; Expected date: 01/10/2023  -     RPR; Future; Expected date: 01/10/2023    Gastroesophageal reflux disease, unspecified whether esophagitis present  -     pantoprazole (PROTONIX) 40 MG tablet; Take 1 tablet (40 mg total) by mouth once daily.  Dispense: 30 tablet; Refill: 5      Follow up in about 8 weeks (around 3/7/2023).        1/10/2023 Tomas Camacho PA-C

## 2023-01-11 LAB
ALBUMIN SERPL-MCNC: 4.4 G/DL (ref 3.6–5.1)
ALBUMIN/GLOB SERPL: 1.7 (CALC) (ref 1–2.5)
ALP SERPL-CCNC: 56 U/L (ref 31–125)
ALT SERPL-CCNC: 10 U/L (ref 6–29)
AST SERPL-CCNC: 17 U/L (ref 10–30)
BASOPHILS # BLD AUTO: 30 CELLS/UL (ref 0–200)
BASOPHILS NFR BLD AUTO: 0.4 %
BILIRUB SERPL-MCNC: 0.4 MG/DL (ref 0.2–1.2)
BUN SERPL-MCNC: 12 MG/DL (ref 7–25)
BUN/CREAT SERPL: NORMAL (CALC) (ref 6–22)
CALCIUM SERPL-MCNC: 9.5 MG/DL (ref 8.6–10.2)
CHLORIDE SERPL-SCNC: 102 MMOL/L (ref 98–110)
CHOLEST SERPL-MCNC: 155 MG/DL
CHOLEST/HDLC SERPL: 3.1 (CALC)
CO2 SERPL-SCNC: 27 MMOL/L (ref 20–32)
CREAT SERPL-MCNC: 0.84 MG/DL (ref 0.5–0.97)
EGFR: 95 ML/MIN/1.73M2
EOSINOPHIL # BLD AUTO: 53 CELLS/UL (ref 15–500)
EOSINOPHIL NFR BLD AUTO: 0.7 %
ERYTHROCYTE [DISTWIDTH] IN BLOOD BY AUTOMATED COUNT: 14.2 % (ref 11–15)
GLOBULIN SER CALC-MCNC: 2.6 G/DL (CALC) (ref 1.9–3.7)
GLUCOSE SERPL-MCNC: 84 MG/DL (ref 65–99)
HCT VFR BLD AUTO: 35.9 % (ref 35–45)
HDLC SERPL-MCNC: 50 MG/DL
HGB BLD-MCNC: 12.1 G/DL (ref 11.7–15.5)
HIV 1+2 AB+HIV1 P24 AG SERPL QL IA: NORMAL
LDLC SERPL CALC-MCNC: 89 MG/DL (CALC)
LYMPHOCYTES # BLD AUTO: 2370 CELLS/UL (ref 850–3900)
LYMPHOCYTES NFR BLD AUTO: 31.6 %
MCH RBC QN AUTO: 30.2 PG (ref 27–33)
MCHC RBC AUTO-ENTMCNC: 33.7 G/DL (ref 32–36)
MCV RBC AUTO: 89.5 FL (ref 80–100)
MONOCYTES # BLD AUTO: 473 CELLS/UL (ref 200–950)
MONOCYTES NFR BLD AUTO: 6.3 %
NEUTROPHILS # BLD AUTO: 4575 CELLS/UL (ref 1500–7800)
NEUTROPHILS NFR BLD AUTO: 61 %
NONHDLC SERPL-MCNC: 105 MG/DL (CALC)
PLATELET # BLD AUTO: 221 THOUSAND/UL (ref 140–400)
PMV BLD REES-ECKER: 12.6 FL (ref 7.5–12.5)
POTASSIUM SERPL-SCNC: 4.2 MMOL/L (ref 3.5–5.3)
PROT SERPL-MCNC: 7 G/DL (ref 6.1–8.1)
RBC # BLD AUTO: 4.01 MILLION/UL (ref 3.8–5.1)
RPR SER QL: NORMAL
SODIUM SERPL-SCNC: 137 MMOL/L (ref 135–146)
TRIGL SERPL-MCNC: 69 MG/DL
TSH SERPL-ACNC: 1.76 MIU/L
WBC # BLD AUTO: 7.5 THOUSAND/UL (ref 3.8–10.8)

## 2023-01-12 ENCOUNTER — TELEPHONE (OUTPATIENT)
Dept: FAMILY MEDICINE | Facility: CLINIC | Age: 33
End: 2023-01-12

## 2023-01-12 LAB
APPEARANCE UR: CLEAR
BACTERIA #/AREA URNS HPF: ABNORMAL /HPF
BACTERIA UR CULT: ABNORMAL
BACTERIA UR CULT: ABNORMAL
BILIRUB UR QL STRIP: NEGATIVE
C TRACH RRNA SPEC QL NAA+PROBE: NOT DETECTED
COLOR UR: YELLOW
COMMENT: NORMAL
GLUCOSE UR QL STRIP: NEGATIVE
HGB UR QL STRIP: NEGATIVE
HYALINE CASTS #/AREA URNS LPF: ABNORMAL /LPF
KETONES UR QL STRIP: NEGATIVE
LEUKOCYTE ESTERASE UR QL STRIP: ABNORMAL
N GONORRHOEA RRNA SPEC QL NAA+PROBE: NOT DETECTED
NITRITE UR QL STRIP: NEGATIVE
PH UR STRIP: 7 [PH] (ref 5–8)
PROT UR QL STRIP: NEGATIVE
RBC #/AREA URNS HPF: ABNORMAL /HPF
SERVICE CMNT-IMP: ABNORMAL
SP GR UR STRIP: 1.01 (ref 1–1.03)
SQUAMOUS #/AREA URNS HPF: ABNORMAL /HPF
WBC #/AREA URNS HPF: ABNORMAL /HPF

## 2023-01-17 DIAGNOSIS — R39.9 UTI SYMPTOMS: Primary | ICD-10-CM

## 2023-01-17 RX ORDER — AMOXICILLIN AND CLAVULANATE POTASSIUM 875; 125 MG/1; MG/1
1 TABLET, FILM COATED ORAL EVERY 12 HOURS
Qty: 10 TABLET | Refills: 0 | Status: SHIPPED | OUTPATIENT
Start: 2023-01-17 | End: 2023-01-22

## 2023-01-17 NOTE — TELEPHONE ENCOUNTER
She did not have any heavy growth of your a pathogenic bacteria but if she is having symptoms we can treat with Augmentin 875 p.o. b.i.d. for 5 days

## 2023-01-17 NOTE — TELEPHONE ENCOUNTER
Pt states she is having foul smelling urine, and now frequent urination. Denies medication allergies. Pharm is [facility] [address]. Would like something sent in. UA done 1/10/23

## 2023-01-17 NOTE — TELEPHONE ENCOUNTER
----- Message from Anam Bergman MA sent at 1/17/2023 11:52 AM CST -----  Regarding: call back  Pt called asking to speak with the nurse and is requesting a call back.

## 2023-03-23 ENCOUNTER — OFFICE VISIT (OUTPATIENT)
Dept: FAMILY MEDICINE | Facility: CLINIC | Age: 33
End: 2023-03-23
Payer: OTHER GOVERNMENT

## 2023-03-23 VITALS
HEIGHT: 65 IN | DIASTOLIC BLOOD PRESSURE: 74 MMHG | SYSTOLIC BLOOD PRESSURE: 114 MMHG | HEART RATE: 72 BPM | WEIGHT: 182 LBS | BODY MASS INDEX: 30.32 KG/M2

## 2023-03-23 DIAGNOSIS — Z00.00 ROUTINE PHYSICAL EXAMINATION: Primary | ICD-10-CM

## 2023-03-23 DIAGNOSIS — K21.9 GASTROESOPHAGEAL REFLUX DISEASE, UNSPECIFIED WHETHER ESOPHAGITIS PRESENT: ICD-10-CM

## 2023-03-23 DIAGNOSIS — K29.50 CHRONIC GASTRITIS WITHOUT BLEEDING, UNSPECIFIED GASTRITIS TYPE: ICD-10-CM

## 2023-03-23 PROCEDURE — 99213 OFFICE O/P EST LOW 20 MIN: CPT | Mod: S$GLB,,, | Performed by: PHYSICIAN ASSISTANT

## 2023-03-23 PROCEDURE — 99213 PR OFFICE/OUTPT VISIT, EST, LEVL III, 20-29 MIN: ICD-10-PCS | Mod: S$GLB,,, | Performed by: PHYSICIAN ASSISTANT

## 2023-03-23 RX ORDER — PANTOPRAZOLE SODIUM 40 MG/1
40 TABLET, DELAYED RELEASE ORAL DAILY
Qty: 90 TABLET | Refills: 1 | Status: SHIPPED | OUTPATIENT
Start: 2023-03-23 | End: 2024-02-26

## 2023-03-23 NOTE — PROGRESS NOTES
SUBJECTIVE:    Patient ID: Estrella Corrales is a 32 y.o. female.    Chief Complaint: Follow-up (8wk, went over med verbally// SW)    This is a 33 yo female who presents today for regular checkup and refills.  She was started on Carafate and Protonix at the last visit for suspected PUD.  Today patient reports that she has been doing MUCH better with regard to the reflux and PUD sxs. Now just maintaining on PPI alone. Has started taking B12 vitamins and green tea.       Office Visit on 01/10/2023   Component Date Value Ref Range Status    Color, UA 01/10/2023 YELLOW  YELLOW Final    Appearance, UA 01/10/2023 CLEAR  CLEAR Final    Specific Gravity, UA 01/10/2023 1.012  1.001 - 1.035 Final    pH, UA 01/10/2023 7.0  5.0 - 8.0 Final    Glucose, UA 01/10/2023 NEGATIVE  NEGATIVE Final    Bilirubin, UA 01/10/2023 NEGATIVE  NEGATIVE Final    Ketones, UA 01/10/2023 NEGATIVE  NEGATIVE Final    Occult Blood UA 01/10/2023 NEGATIVE  NEGATIVE Final    Protein, UA 01/10/2023 NEGATIVE  NEGATIVE Final    Nitrite, UA 01/10/2023 NEGATIVE  NEGATIVE Final    Leukocytes, UA 01/10/2023 3+ (A)  NEGATIVE Final    WBC Casts, UA 01/10/2023 10-20 (A)  < OR = 5 /HPF Final    RBC Casts, UA 01/10/2023 NONE SEEN  < OR = 2 /HPF Final    Squam Epithel, UA 01/10/2023 0-5  < OR = 5 /HPF Final    Bacteria, UA 01/10/2023 NONE SEEN  NONE SEEN /HPF Final    Hyaline Casts, UA 01/10/2023 NONE SEEN  NONE SEEN /LPF Final    Service Cmt: 01/10/2023    Final    Reflexive Urine Culture 01/10/2023    Final    Urine Culture, Routine 01/10/2023    Final    Chlamydia Trachomatis RNA, TMA, Ur* 01/10/2023 NOT DETECTED  NOT DETECTED Final    Neisseria Gonorrhoeae RNA, TMA, Ur* 01/10/2023 NOT DETECTED  NOT DETECTED Final    Comment 01/10/2023    Final    WBC 01/10/2023 7.5  3.8 - 10.8 Thousand/uL Final    RBC 01/10/2023 4.01  3.80 - 5.10 Million/uL Final    Hemoglobin 01/10/2023 12.1  11.7 - 15.5 g/dL Final    Hematocrit 01/10/2023 35.9  35.0 - 45.0 % Final    MCV  01/10/2023 89.5  80.0 - 100.0 fL Final    MCH 01/10/2023 30.2  27.0 - 33.0 pg Final    MCHC 01/10/2023 33.7  32.0 - 36.0 g/dL Final    RDW 01/10/2023 14.2  11.0 - 15.0 % Final    Platelets 01/10/2023 221  140 - 400 Thousand/uL Final    MPV 01/10/2023 12.6 (H)  7.5 - 12.5 fL Final    Neutrophils, Abs 01/10/2023 4,575  1,500 - 7,800 cells/uL Final    Lymph # 01/10/2023 2,370  850 - 3,900 cells/uL Final    Mono # 01/10/2023 473  200 - 950 cells/uL Final    Eos # 01/10/2023 53  15 - 500 cells/uL Final    Baso # 01/10/2023 30  0 - 200 cells/uL Final    Neutrophils Relative 01/10/2023 61  % Final    Lymph % 01/10/2023 31.6  % Final    Mono % 01/10/2023 6.3  % Final    Eosinophil % 01/10/2023 0.7  % Final    Basophil % 01/10/2023 0.4  % Final    Glucose 01/10/2023 84  65 - 99 mg/dL Final    BUN 01/10/2023 12  7 - 25 mg/dL Final    Creatinine 01/10/2023 0.84  0.50 - 0.97 mg/dL Final    eGFR 01/10/2023 95  > OR = 60 mL/min/1.73m2 Final    BUN/Creatinine Ratio 01/10/2023 NOT APPLICABLE  6 - 22 (calc) Final    Sodium 01/10/2023 137  135 - 146 mmol/L Final    Potassium 01/10/2023 4.2  3.5 - 5.3 mmol/L Final    Chloride 01/10/2023 102  98 - 110 mmol/L Final    CO2 01/10/2023 27  20 - 32 mmol/L Final    Calcium 01/10/2023 9.5  8.6 - 10.2 mg/dL Final    Total Protein 01/10/2023 7.0  6.1 - 8.1 g/dL Final    Albumin 01/10/2023 4.4  3.6 - 5.1 g/dL Final    Globulin, Total 01/10/2023 2.6  1.9 - 3.7 g/dL (calc) Final    Albumin/Globulin Ratio 01/10/2023 1.7  1.0 - 2.5 (calc) Final    Total Bilirubin 01/10/2023 0.4  0.2 - 1.2 mg/dL Final    Alkaline Phosphatase 01/10/2023 56  31 - 125 U/L Final    AST 01/10/2023 17  10 - 30 U/L Final    ALT 01/10/2023 10  6 - 29 U/L Final    Cholesterol 01/10/2023 155  <200 mg/dL Final    HDL 01/10/2023 50  > OR = 50 mg/dL Final    Triglycerides 01/10/2023 69  <150 mg/dL Final    LDL Cholesterol 01/10/2023 89  mg/dL (calc) Final    HDL/Cholesterol Ratio 01/10/2023 3.1  <5.0 (calc) Final    Non HDL Chol.  "(LDL+VLDL) 01/10/2023 105  <130 mg/dL (calc) Final    TSH w/reflex to FT4 01/10/2023 1.76  mIU/L Final    HIV Ag/Ab 4th Gen 01/10/2023 NON-REACTIVE  NON-REACTIVE Final    RPR (Dx) w/Refl Titer and Confirma* 01/10/2023 NON-REACTIVE  NON-REACTIVE Final       Past Medical History:   Diagnosis Date    GERD (gastroesophageal reflux disease)      History reviewed. No pertinent surgical history.  Family History   Problem Relation Age of Onset    Hypertension Mother     Kidney disease Mother     Cancer Maternal Grandmother        Marital Status: Single  Alcohol History:  reports current alcohol use of about 1.0 standard drink per week.  Tobacco History:  reports that she has never smoked. She has never been exposed to tobacco smoke. She has never used smokeless tobacco.  Drug History:  reports no history of drug use.    Review of patient's allergies indicates:  No Known Allergies    Current Outpatient Medications:     pantoprazole (PROTONIX) 40 MG tablet, Take 1 tablet (40 mg total) by mouth once daily., Disp: 90 tablet, Rfl: 1    Review of Systems   Constitutional:  Negative for appetite change, chills, fatigue, fever and unexpected weight change.   HENT:  Negative for congestion.    Respiratory:  Negative for cough, chest tightness and shortness of breath.    Cardiovascular:  Negative for chest pain and palpitations.   Gastrointestinal:  Negative for abdominal distention and abdominal pain.   Endocrine: Negative for cold intolerance and heat intolerance.   Genitourinary:  Negative for difficulty urinating and dysuria.   Musculoskeletal:  Negative for arthralgias and back pain.   Neurological:  Negative for dizziness, weakness and headaches.        Objective:      Vitals:    03/23/23 1124   BP: 114/74   Pulse: 72   Weight: 82.6 kg (182 lb)   Height: 5' 5" (1.651 m)     Physical Exam  Constitutional:       General: She is not in acute distress.     Appearance: She is well-developed.   HENT:      Head: Normocephalic and " atraumatic.   Eyes:      Conjunctiva/sclera: Conjunctivae normal.      Pupils: Pupils are equal, round, and reactive to light.   Neck:      Thyroid: No thyromegaly.   Cardiovascular:      Rate and Rhythm: Normal rate and regular rhythm.      Heart sounds: Normal heart sounds.   Pulmonary:      Effort: Pulmonary effort is normal.      Breath sounds: Normal breath sounds.   Abdominal:      General: Bowel sounds are normal. There is no distension.      Palpations: Abdomen is soft.      Tenderness: There is no abdominal tenderness.   Musculoskeletal:         General: Normal range of motion.      Cervical back: Normal range of motion and neck supple.   Skin:     General: Skin is warm and dry.      Findings: No erythema.   Neurological:      Mental Status: She is alert and oriented to person, place, and time.      Cranial Nerves: No cranial nerve deficit.         Assessment:       1. Routine physical examination    2. Chronic gastritis without bleeding, unspecified gastritis type    3. Gastroesophageal reflux disease, unspecified whether esophagitis present         Plan:       Routine physical examination  Comments:  Very healthy individual. Labs reviewed for ongoing pt care.    Chronic gastritis without bleeding, unspecified gastritis type  Comments:  Overall, symptoms are doing much better now. will maintain as is with PPI.  Orders:  -     pantoprazole (PROTONIX) 40 MG tablet; Take 1 tablet (40 mg total) by mouth once daily.  Dispense: 90 tablet; Refill: 1    Gastroesophageal reflux disease, unspecified whether esophagitis present  -     pantoprazole (PROTONIX) 40 MG tablet; Take 1 tablet (40 mg total) by mouth once daily.  Dispense: 90 tablet; Refill: 1      Follow up in about 6 months (around 9/23/2023), or if symptoms worsen or fail to improve.        3/23/2023 Tomas Camacho PA-C

## 2023-04-17 ENCOUNTER — TELEPHONE (OUTPATIENT)
Dept: FAMILY MEDICINE | Facility: CLINIC | Age: 33
End: 2023-04-17

## 2023-04-17 DIAGNOSIS — Z11.3 SCREENING EXAMINATION FOR STD (SEXUALLY TRANSMITTED DISEASE): Primary | ICD-10-CM

## 2023-04-17 NOTE — TELEPHONE ENCOUNTER
----- Message from Trice Gannon MA sent at 4/17/2023 10:33 AM CDT -----  Regarding: lower abdominal pain w/discharge  Complains of lower abdominal pain w/tan,yellow,grayish color, no odor or itching. Would like an appt soon  [phone]

## 2023-04-17 NOTE — TELEPHONE ENCOUNTER
Pt states she has been having lower abd pain and states she has been having weird colored discharge tan/gray/yellow. Denies odor or itching. Symptoms x 2 weeks. Advised she should go to OBGYN for eval. Pt declines. States she wants STD testing.

## 2023-09-19 ENCOUNTER — OFFICE VISIT (OUTPATIENT)
Dept: FAMILY MEDICINE | Facility: CLINIC | Age: 33
End: 2023-09-19
Payer: OTHER GOVERNMENT

## 2023-09-19 ENCOUNTER — TELEPHONE (OUTPATIENT)
Dept: FAMILY MEDICINE | Facility: CLINIC | Age: 33
End: 2023-09-19

## 2023-09-19 VITALS
OXYGEN SATURATION: 98 % | DIASTOLIC BLOOD PRESSURE: 74 MMHG | BODY MASS INDEX: 30.79 KG/M2 | SYSTOLIC BLOOD PRESSURE: 108 MMHG | HEART RATE: 64 BPM | WEIGHT: 185 LBS

## 2023-09-19 DIAGNOSIS — Z01.419 WELL WOMAN EXAM: ICD-10-CM

## 2023-09-19 DIAGNOSIS — F51.01 PRIMARY INSOMNIA: Primary | ICD-10-CM

## 2023-09-19 DIAGNOSIS — Z00.00 ROUTINE PHYSICAL EXAMINATION: ICD-10-CM

## 2023-09-19 PROCEDURE — 99214 OFFICE O/P EST MOD 30 MIN: CPT | Mod: S$GLB,,, | Performed by: PHYSICIAN ASSISTANT

## 2023-09-19 PROCEDURE — 99214 PR OFFICE/OUTPT VISIT, EST, LEVL IV, 30-39 MIN: ICD-10-PCS | Mod: S$GLB,,, | Performed by: PHYSICIAN ASSISTANT

## 2023-09-19 RX ORDER — TRAZODONE HYDROCHLORIDE 50 MG/1
50 TABLET ORAL NIGHTLY
Qty: 30 TABLET | Refills: 2 | Status: SHIPPED | OUTPATIENT
Start: 2023-09-19 | End: 2024-02-26

## 2023-09-19 NOTE — TELEPHONE ENCOUNTER
----- Message from Katie Bergman sent at 9/19/2023  9:09 AM CDT -----  Pt was seen today and needs an 8 week f/u appt.  [phone]

## 2023-09-19 NOTE — PROGRESS NOTES
SUBJECTIVE:    Patient ID: Estrella Corrales is a 32 y.o. female.    Chief Complaint: 6M Check Up, intermittent pain in lower abdomen, and not sleeping well    This is a 32-year-old female who presents today for six-month follow-up.  Past medical history significant for GERD and just takes Protonix 40 mg daily.  She reports intermittent pain in lower abdomen, not sleeping well. She reports trouble staying asleep. Thinks that bladder may also be bothersome. Once she gets up she has difficulty getting back to sleep. She has tried otc melatonin that just makes her feel groggy in the morning.     Here previous GYN- Dr. Sky, will need referral for new GYN.        Telephone on 04/17/2023   Component Date Value Ref Range Status    HIV Ag/Ab 4th Gen 04/17/2023 NON-REACTIVE  NON-REACTIVE Final    Chlamydia Trachomatis RNA, TMA, Ur* 04/17/2023 NOT DETECTED  NOT DETECTED Final    Neisseria Gonorrhoeae RNA, TMA, Ur* 04/17/2023 NOT DETECTED  NOT DETECTED Final    Comment 04/17/2023    Final    RPR (Dx) w/Refl Titer and Confirma* 04/17/2023 NON-REACTIVE  NON-REACTIVE Final       Past Medical History:   Diagnosis Date    GERD (gastroesophageal reflux disease)      History reviewed. No pertinent surgical history.  Family History   Problem Relation Age of Onset    Hypertension Mother     Kidney disease Mother     Cancer Maternal Grandmother        Marital Status: Single  Alcohol History:  reports current alcohol use of about 1.0 standard drink of alcohol per week.  Tobacco History:  reports that she has never smoked. She has never been exposed to tobacco smoke. She has never used smokeless tobacco.  Drug History:  reports no history of drug use.    Review of patient's allergies indicates:  No Known Allergies    Current Outpatient Medications:     pantoprazole (PROTONIX) 40 MG tablet, Take 1 tablet (40 mg total) by mouth once daily., Disp: 90 tablet, Rfl: 1    traZODone (DESYREL) 50 MG tablet, Take 1 tablet (50 mg total) by mouth  every evening., Disp: 30 tablet, Rfl: 2    Review of Systems   Constitutional:  Negative for appetite change, chills, fatigue, fever and unexpected weight change.   HENT:  Negative for congestion.    Respiratory:  Negative for cough, chest tightness and shortness of breath.    Cardiovascular:  Negative for chest pain and palpitations.   Gastrointestinal:  Negative for abdominal distention and abdominal pain.   Endocrine: Negative for cold intolerance and heat intolerance.   Genitourinary:  Negative for difficulty urinating and dysuria.   Musculoskeletal:  Negative for arthralgias and back pain.   Neurological:  Negative for dizziness, weakness and headaches.   Psychiatric/Behavioral:  Positive for sleep disturbance.           Objective:      Vitals:    09/19/23 0844   BP: 108/74   Pulse: 64   SpO2: 98%   Weight: 83.9 kg (185 lb)     Physical Exam  Constitutional:       General: She is not in acute distress.     Appearance: She is well-developed.   HENT:      Head: Normocephalic and atraumatic.   Eyes:      Conjunctiva/sclera: Conjunctivae normal.      Pupils: Pupils are equal, round, and reactive to light.   Neck:      Thyroid: No thyromegaly.   Cardiovascular:      Rate and Rhythm: Normal rate and regular rhythm.      Heart sounds: Normal heart sounds.   Pulmonary:      Effort: Pulmonary effort is normal.      Breath sounds: Normal breath sounds.   Abdominal:      General: Bowel sounds are normal. There is no distension.      Palpations: Abdomen is soft.      Tenderness: There is no abdominal tenderness.   Musculoskeletal:         General: Normal range of motion.      Cervical back: Normal range of motion and neck supple.   Skin:     General: Skin is warm and dry.      Findings: No erythema.   Neurological:      Mental Status: She is alert and oriented to person, place, and time.      Cranial Nerves: No cranial nerve deficit.           Assessment:       1. Primary insomnia    2. Well woman exam    3. Routine  physical examination         Plan:       Primary insomnia  Comments:  trial with trazodone low dose qhs. will plan to followup in about 6-8 weeks to discuss efficacy.  Orders:  -     traZODone (DESYREL) 50 MG tablet; Take 1 tablet (50 mg total) by mouth every evening.  Dispense: 30 tablet; Refill: 2    Well woman exam  Comments:  will place referral for GYN now.  Orders:  -     Ambulatory referral/consult to Gynecology; Future; Expected date: 09/19/2023    Routine physical examination      Follow up in about 8 weeks (around 11/14/2023).        9/19/2023 Tomas Camacho PA-C

## 2023-09-20 ENCOUNTER — TELEPHONE (OUTPATIENT)
Dept: FAMILY MEDICINE | Facility: CLINIC | Age: 33
End: 2023-09-20
Payer: OTHER GOVERNMENT

## 2023-09-20 ENCOUNTER — OFFICE VISIT (OUTPATIENT)
Dept: OBSTETRICS AND GYNECOLOGY | Facility: CLINIC | Age: 33
End: 2023-09-20
Payer: OTHER GOVERNMENT

## 2023-09-20 VITALS
SYSTOLIC BLOOD PRESSURE: 110 MMHG | BODY MASS INDEX: 30.89 KG/M2 | HEIGHT: 65 IN | WEIGHT: 185.44 LBS | DIASTOLIC BLOOD PRESSURE: 80 MMHG

## 2023-09-20 DIAGNOSIS — R10.2 PELVIC PAIN: ICD-10-CM

## 2023-09-20 DIAGNOSIS — Z12.4 SCREENING FOR MALIGNANT NEOPLASM OF CERVIX: Primary | ICD-10-CM

## 2023-09-20 DIAGNOSIS — Z01.419 WELL WOMAN EXAM: ICD-10-CM

## 2023-09-20 LAB
BILIRUBIN, UA POC OHS: NEGATIVE
BLOOD, UA POC OHS: NEGATIVE
CLARITY, UA POC OHS: CLEAR
COLOR, UA POC OHS: YELLOW
GLUCOSE, UA POC OHS: NEGATIVE
KETONES, UA POC OHS: NEGATIVE
LEUKOCYTES, UA POC OHS: NEGATIVE
NITRITE, UA POC OHS: NEGATIVE
PH, UA POC OHS: 6.5
PROTEIN, UA POC OHS: NEGATIVE
SPECIFIC GRAVITY, UA POC OHS: 1.02
UROBILINOGEN, UA POC OHS: 0.2

## 2023-09-20 PROCEDURE — 99999 PR PBB SHADOW E&M-EST. PATIENT-LVL III: ICD-10-PCS | Mod: PBBFAC,,, | Performed by: OBSTETRICS & GYNECOLOGY

## 2023-09-20 PROCEDURE — 99999 PR PBB SHADOW E&M-EST. PATIENT-LVL III: CPT | Mod: PBBFAC,,, | Performed by: OBSTETRICS & GYNECOLOGY

## 2023-09-20 PROCEDURE — 99999PBSHW POCT URINALYSIS(INSTRUMENT): Mod: PBBFAC,,,

## 2023-09-20 PROCEDURE — 99999PBSHW POCT URINALYSIS(INSTRUMENT): ICD-10-PCS | Mod: PBBFAC,,,

## 2023-09-20 PROCEDURE — 87086 URINE CULTURE/COLONY COUNT: CPT | Performed by: OBSTETRICS & GYNECOLOGY

## 2023-09-20 PROCEDURE — 99385 PR PREVENTIVE VISIT,NEW,18-39: ICD-10-PCS | Mod: S$PBB,,, | Performed by: OBSTETRICS & GYNECOLOGY

## 2023-09-20 PROCEDURE — 99213 OFFICE O/P EST LOW 20 MIN: CPT | Mod: PBBFAC,PO | Performed by: OBSTETRICS & GYNECOLOGY

## 2023-09-20 PROCEDURE — 87088 URINE BACTERIA CULTURE: CPT | Performed by: OBSTETRICS & GYNECOLOGY

## 2023-09-20 PROCEDURE — 88175 CYTOPATH C/V AUTO FLUID REDO: CPT | Performed by: OBSTETRICS & GYNECOLOGY

## 2023-09-20 PROCEDURE — 87624 HPV HI-RISK TYP POOLED RSLT: CPT | Performed by: OBSTETRICS & GYNECOLOGY

## 2023-09-20 PROCEDURE — 81003 URINALYSIS AUTO W/O SCOPE: CPT | Mod: PBBFAC,PO | Performed by: OBSTETRICS & GYNECOLOGY

## 2023-09-20 PROCEDURE — 99385 PREV VISIT NEW AGE 18-39: CPT | Mod: S$PBB,,, | Performed by: OBSTETRICS & GYNECOLOGY

## 2023-09-20 NOTE — PROGRESS NOTES
"Chief Complaint   Patient presents with    Well Woman     Pt states that she is here for her well woman exam and that she has been having some uterine cramping with or without her menstrual       History and Physical:  Patient's last menstrual period was 2023 (within days).    Contraception: None.  Not currently active    Date: 2023    Estrella Corrales is a 32 y.o.  who presents today for her routine annual GYN exam.   From a gynecologic standpoint she notes some central pelvic cramping over the last few weeks.  This has been both with and without association to the menstrual cycle.    In the past she has had a history of "cysts".  Menstrual cycles are fairly regular but every 21 days with occasional clots.  No urologic issues  The patient does have occasional episodes of nausea but feels this is related to her GERD.    Family history:  Breast cancer:  Negative    Colon cancer:  Negative   Gyn related cancer:  Negative       Allergies: Review of patient's allergies indicates:  No Known Allergies    Past Medical History:   Diagnosis Date    GERD (gastroesophageal reflux disease)        History reviewed. No pertinent surgical history.    MEDS:   Current Outpatient Medications:     pantoprazole (PROTONIX) 40 MG tablet, Take 1 tablet (40 mg total) by mouth once daily., Disp: 90 tablet, Rfl: 1    traZODone (DESYREL) 50 MG tablet, Take 1 tablet (50 mg total) by mouth every evening., Disp: 30 tablet, Rfl: 2     OB History          1    Para   1    Term   1            AB        Living   1         SAB        IAB        Ectopic        Multiple        Live Births               Obstetric Comments   Vaginal delivery x 1               Social History     Tobacco Use    Smoking status: Never     Passive exposure: Never    Smokeless tobacco: Never   Substance Use Topics    Alcohol use: Yes     Alcohol/week: 1.0 standard drink of alcohol     Types: 1 Glasses of wine per week    Drug use: Never " "      Family History   Problem Relation Age of Onset    Hypertension Mother     Kidney disease Mother     Cancer Maternal Grandmother         Lung CA       Past medical and surgical history reviewed.   I have reviewed the patient's medical history in detail and updated the computerized patient record.    Review of Systems (at today's evaluation)  Review of Systems   Constitutional:  Negative for fever and unexpected weight change.   HENT:  Negative for congestion, ear pain, nosebleeds, sinus pain and sore throat.    Eyes: Negative.    Respiratory:  Negative for cough and shortness of breath.    Cardiovascular:  Negative for chest pain and palpitations.   Gastrointestinal:  Positive for nausea. Negative for constipation, diarrhea and vomiting.   Endocrine: Negative.    Genitourinary:  Negative for dysuria, frequency, hematuria and urgency.        Gyn as per HPI   Musculoskeletal:  Negative for arthralgias and myalgias.   Skin:  Negative for rash.   Neurological:  Negative for weakness and headaches.   Psychiatric/Behavioral:  The patient is not nervous/anxious.         Physical Exam:   /80 (BP Location: Right arm, Patient Position: Sitting, BP Method: Medium (Automatic))   Ht 5' 5" (1.651 m)   Wt 84.1 kg (185 lb 6.5 oz)   LMP 09/12/2023 (Within Days)   BMI 30.85 kg/m²       Physical Exam:   Constitutional: She appears well-developed and well-nourished.    HENT:   Head: Normocephalic.     Neck: No thyroid mass present.    Cardiovascular:  Normal rate.             Pulmonary/Chest: Effort normal. Right breast exhibits no mass, no nipple discharge and no skin change. Left breast exhibits no mass, no nipple discharge and no skin change.        Abdominal: Soft. There is no abdominal tenderness.     Genitourinary:    Inguinal canal, vagina, right adnexa and left adnexa normal.      Pelvic exam was performed with patient supine.   The external female genitalia was normal.   No external genitalia lesions " identified,Cervix is normal. Right adnexum displays no mass and no tenderness. Left adnexum displays no mass and no tenderness. No tenderness or bleeding in the vagina. Uterus is tender (mild tenderness in the central pelvis with a firm palpable uterus). Normal urethral meatus.Urethra findings: no tendernessBladder findings: no bladder tenderness          Musculoskeletal:      Right lower leg: No edema.      Left lower leg: No edema.      Lymphadenopathy:     She has no cervical adenopathy. No inguinal adenopathy noted on the right or left side.    Neurological: She is alert.   No gross defects noted    Skin: Skin is warm and dry.    Psychiatric: Mood normal.        Office urinalysis on 9/202/2023    Negative:  Glucose / Bilirubin / Ketones / Blood / Protein / Nitrites / Leukocyte esterase    Assessment:        1. Screening for malignant neoplasm of cervix    2. Well woman exam    3. Pelvic pain         Plan:      Screening for malignant neoplasm of cervix  -     HPV High Risk Genotypes, PCR  -     Liquid-Based Pap Smear, Screening    Well woman exam  Comments:  will place referral for GYN now.  Orders:  -     Ambulatory referral/consult to Gynecology    Pelvic pain  -     POCT Urinalysis(Instrument)  -     Urine culture  -     US Pelvis Comp with Transvag NON-OB (xpd; Future; Expected date: 09/20/2023       Follow up for ASAP after recommended testing obtained, Follow-up on today's evaluation.     The above was reviewed and discussed with the patient.    Annual exam and screening issues based on the patient's age, medical history and family history were reviewed / discussed.  Routine health maintenance issues were reviewed and discussed.      We discussed the patient's recent issues with pelvic pain.  Office urinalysis was negative but urine will be sent for culture and sensitivity.    A pelvic ultrasound has been ordered   We will base further evaluation treatment results of the ultrasound.    The patient's  questions were answered, and she is in agreement with the current plan.     Damon Patel MD  Department [facility]  [facility]

## 2023-09-21 ENCOUNTER — HOSPITAL ENCOUNTER (OUTPATIENT)
Dept: RADIOLOGY | Facility: HOSPITAL | Age: 33
Discharge: HOME | End: 2023-09-21
Attending: OBSTETRICS & GYNECOLOGY
Payer: OTHER GOVERNMENT

## 2023-09-21 DIAGNOSIS — R10.2 PELVIC PAIN: ICD-10-CM

## 2023-09-21 PROCEDURE — 76856 US PELVIS COMP WITH TRANSVAG NON-OB (XPD): ICD-10-PCS | Mod: 26,,, | Performed by: RADIOLOGY

## 2023-09-21 PROCEDURE — 76830 TRANSVAGINAL US NON-OB: CPT | Mod: TC,PO

## 2023-09-21 PROCEDURE — 76830 TRANSVAGINAL US NON-OB: CPT | Mod: 26,,, | Performed by: RADIOLOGY

## 2023-09-21 PROCEDURE — 76856 US EXAM PELVIC COMPLETE: CPT | Mod: 26,,, | Performed by: RADIOLOGY

## 2023-09-21 PROCEDURE — 76830 US PELVIS COMP WITH TRANSVAG NON-OB (XPD): ICD-10-PCS | Mod: 26,,, | Performed by: RADIOLOGY

## 2023-09-22 LAB — BACTERIA UR CULT: ABNORMAL

## 2023-09-26 LAB
FINAL PATHOLOGIC DIAGNOSIS: NORMAL
HPV HR 12 DNA SPEC QL NAA+PROBE: POSITIVE
HPV16 AG SPEC QL: NEGATIVE
HPV18 DNA SPEC QL NAA+PROBE: NEGATIVE
Lab: NORMAL

## 2023-09-27 ENCOUNTER — OFFICE VISIT (OUTPATIENT)
Dept: OBSTETRICS AND GYNECOLOGY | Facility: CLINIC | Age: 33
End: 2023-09-27
Payer: OTHER GOVERNMENT

## 2023-09-27 VITALS
WEIGHT: 189.13 LBS | RESPIRATION RATE: 18 BRPM | DIASTOLIC BLOOD PRESSURE: 84 MMHG | SYSTOLIC BLOOD PRESSURE: 120 MMHG | BODY MASS INDEX: 31.51 KG/M2 | HEIGHT: 65 IN

## 2023-09-27 DIAGNOSIS — B97.7 HPV IN FEMALE: ICD-10-CM

## 2023-09-27 DIAGNOSIS — R10.2 PELVIC PAIN: Primary | ICD-10-CM

## 2023-09-27 PROCEDURE — 99214 OFFICE O/P EST MOD 30 MIN: CPT | Mod: S$PBB,,, | Performed by: OBSTETRICS & GYNECOLOGY

## 2023-09-27 PROCEDURE — 99999 PR PBB SHADOW E&M-EST. PATIENT-LVL III: ICD-10-PCS | Mod: PBBFAC,,, | Performed by: OBSTETRICS & GYNECOLOGY

## 2023-09-27 PROCEDURE — 99213 OFFICE O/P EST LOW 20 MIN: CPT | Mod: PBBFAC,PO | Performed by: OBSTETRICS & GYNECOLOGY

## 2023-09-27 PROCEDURE — 99214 PR OFFICE/OUTPT VISIT, EST, LEVL IV, 30-39 MIN: ICD-10-PCS | Mod: S$PBB,,, | Performed by: OBSTETRICS & GYNECOLOGY

## 2023-09-27 PROCEDURE — 99999 PR PBB SHADOW E&M-EST. PATIENT-LVL III: CPT | Mod: PBBFAC,,, | Performed by: OBSTETRICS & GYNECOLOGY

## 2023-09-27 NOTE — PROGRESS NOTES
"Chief Complaint   Patient presents with    Follow-up     U/S results       History and Physical:  Patient's last menstrual period was 2023 (within days).    Contraception: None.  Not currently active    Date: 2023    Estrella Corrales is a 32 y.o.  who presents today for her routine annual GYN exam.   From a gynecologic standpoint she notes some central pelvic cramping over the last few weeks.  This has been both with and without association to the menstrual cycle.    In the past she has had a history of "cysts".  Menstrual cycles are fairly regular but every 21 days with occasional clots.  No urologic issues  The patient does have occasional episodes of nausea but feels this is related to her GERD.    Family history:  Breast cancer:  Negative    Colon cancer:  Negative   Gyn related cancer:  Negative     Date: 2023    The patient presents today for follow-up.  She was last seen as above.  In light of her pelvic pain pelvic ultrasound was ordered and the patient presents today to discuss the results.    She reports the pain tends to be central but does radiate to both the right and the left.    Allergies: Review of patient's allergies indicates:  No Known Allergies    Past Medical History:   Diagnosis Date    GERD (gastroesophageal reflux disease)        History reviewed. No pertinent surgical history.    MEDS:   Current Outpatient Medications:     pantoprazole (PROTONIX) 40 MG tablet, Take 1 tablet (40 mg total) by mouth once daily., Disp: 90 tablet, Rfl: 1    traZODone (DESYREL) 50 MG tablet, Take 1 tablet (50 mg total) by mouth every evening., Disp: 30 tablet, Rfl: 2     OB History          1    Para   1    Term   1            AB        Living   1         SAB        IAB        Ectopic        Multiple        Live Births               Obstetric Comments   Vaginal delivery x 1               Social History     Tobacco Use    Smoking status: Never     Passive exposure: Never    " "Smokeless tobacco: Never   Substance Use Topics    Alcohol use: Yes     Alcohol/week: 1.0 standard drink of alcohol     Types: 1 Glasses of wine per week    Drug use: Never       Family History   Problem Relation Age of Onset    Hypertension Mother     Kidney disease Mother     Cancer Maternal Grandmother         Lung CA       Past medical and surgical history reviewed.   I have reviewed the patient's medical history in detail and updated the computerized patient record.    Review of Systems (at today's evaluation)  Review of Systems   Constitutional:  Negative for fever and unexpected weight change.   Respiratory:  Negative for cough and shortness of breath.    Cardiovascular:  Negative for chest pain.   Gastrointestinal:  Negative for constipation, diarrhea and nausea.   Genitourinary:  Negative for dysuria and frequency.          GYN AS PER HPI   Musculoskeletal: Negative.    Skin: Negative.    Neurological: Negative.         Physical Exam:   /84 (BP Location: Left arm, Patient Position: Sitting, BP Method: Medium (Manual))   Resp 18   Ht 5' 5" (1.651 m)   Wt 85.8 kg (189 lb 2.5 oz)   LMP 09/12/2023 (Within Days)   BMI 31.48 kg/m²       Physical Exam:   Constitutional: She appears well-developed and well-nourished.    HENT:   Head: Normocephalic.     Neck: No thyroid mass present.    Cardiovascular:  Normal rate.             Pulmonary/Chest: Effort normal. No respiratory distress.        Abdominal: Soft. There is no abdominal tenderness.             Musculoskeletal: Normal range of motion.      Right lower leg: No edema.      Left lower leg: No edema.       Neurological: She is alert.   No gross lesions noted.    Skin: Skin is warm and dry.    Psychiatric: She has a normal mood and affect. Her speech is normal and behavior is normal. Mood normal.        Office urinalysis on 9/202/2023    Negative:  Glucose / Bilirubin / Ketones / Blood / Protein / Nitrites / Leukocyte esterase    Recent Laboratory " Results:    Pap smear from 9/20/2023 noted no cervical cell abnormalities and was HPV positive but negative for high-risk type 16/18     Urine culture noted 10,000 to 49,999 of lactobacillus      Recent Radiology Results:     9/21/2023 Routine     Narrative & Impression  EXAMINATION:  US PELVIS COMP WITH TRANSVAG NON-OB (XPD)     CLINICAL HISTORY:  Pelvic and perineal pain    FINDINGS:  Uterus:     Size: 10.4 x 7 x 6.8 cm     Masses:  3.5 x 3.2 x 3.8 cm and 3.6 x 4.3 x 5.5 cm masses consistent with uterine fibroids 1 of which appears anechoic centrally suggesting small area of central necrosis.     Endometrium: Normal in this pre menopausal patient, measuring 10 mm.     Right ovary:     Size: 3.4 x 3.2 x 2.6 cm     Appearance: 2.3 x 1.9 x 1.6 cm cyst     Vascular flow: Normal.     Left ovary:     Size: 3 x 1.6 x 2.7 cm     Appearance: 1.5 x 1.3 x 2.6 cm cyst appearing adjacent to the left ovary     Vascular Flow: Normal.     Free Fluid:     None.     Impression:     Uterine fibroids.  Ovarian/paraovarian cysts        Assessment:        1. 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     The above was reviewed and discussed with the patient.      We discussed the results of her recent Pap smear were negative for cervical lesion but positive for HPV negative for 16/18.  The significance of this finding was discussed.    We reviewed the results of her recent urinary culture which appears to be compatible with a vaginal contamination     We discussed the findings of uterine myoma as well as a small laura left ovarian cyst.    Conservative, medical (oral contraceptives, the transdermal patch, the contraceptive ring, progesterone based birth control such as Depo-Provera the Nexplanon and IUDs) and surgical intervention (laparoscopy, hysterectomy, myomectomy and Interventional Radiology) were reviewed and discussed.  At this time  based on the above the patient is comfortable with proceeding from a conservative standpoint and will notify us of any changes or other gynecologic issues.    The patient's questions were answered, and she is in agreement with the current plan.     Damon Patel MD  Department OBGYN  [facility]

## 2023-11-13 ENCOUNTER — OFFICE VISIT (OUTPATIENT)
Dept: FAMILY MEDICINE | Facility: CLINIC | Age: 33
End: 2023-11-13
Payer: OTHER GOVERNMENT

## 2023-11-13 VITALS
DIASTOLIC BLOOD PRESSURE: 76 MMHG | WEIGHT: 176 LBS | OXYGEN SATURATION: 99 % | HEIGHT: 65 IN | BODY MASS INDEX: 29.32 KG/M2 | HEART RATE: 78 BPM | SYSTOLIC BLOOD PRESSURE: 110 MMHG

## 2023-11-13 DIAGNOSIS — F51.01 PRIMARY INSOMNIA: Primary | ICD-10-CM

## 2023-11-13 DIAGNOSIS — K59.00 CONSTIPATION, UNSPECIFIED CONSTIPATION TYPE: ICD-10-CM

## 2023-11-13 DIAGNOSIS — R17 ICTERUS: ICD-10-CM

## 2023-11-13 PROCEDURE — 99214 PR OFFICE/OUTPT VISIT, EST, LEVL IV, 30-39 MIN: ICD-10-PCS | Mod: S$GLB,,, | Performed by: PHYSICIAN ASSISTANT

## 2023-11-13 PROCEDURE — 99214 OFFICE O/P EST MOD 30 MIN: CPT | Mod: S$GLB,,, | Performed by: PHYSICIAN ASSISTANT

## 2023-11-13 NOTE — PROGRESS NOTES
SUBJECTIVE:    Patient ID: Estrella Corrales is a 33 y.o. female.    Chief Complaint: Follow-up (No bottles, discuss colonoscopy and BM//BA )    Estrelal Corrales is a 33 y.o. female presenting for 8 week follow up. Last visit she was referred to gynecology for lower abdominal pain. After further work up it was noted she had fibroids most likely causing her pain. She reports now pain has resolved. She was recently started on trazodone due to struggling with sleep. Reports she that her sleep problems resolved on its own without having to take the medication.    She complains today of infrequent bowel movements. States she has only 1 BM a week. She had a colonoscopy done 3 years ago and noted she has a redundant colon that can cause these symptoms. She has been experiencing these symptoms for years and feels like she should be having BM more often. Has increased fiber and leafy greens in diet with no changes. Tried metamucil without relief. Denies abdominal pain.    Follow-up  Pertinent negatives include no abdominal pain, arthralgias, chest pain, chills, congestion, coughing, fever, headaches, sore throat or vomiting.       Office Visit on 09/20/2023   Component Date Value Ref Range Status    Color, POC UA 09/20/2023 Yellow  Yellow, Straw, Colorless Final    Clarity, POC UA 09/20/2023 Clear  Clear Final    Glucose, POC UA 09/20/2023 Negative  Negative Final    Bilirubin, POC UA 09/20/2023 Negative  Negative Final    Ketones, POC UA 09/20/2023 Negative  Negative Final    Spec Grav POC UA 09/20/2023 1.020  1.005 - 1.030 Final    Blood, POC UA 09/20/2023 Negative  Negative Final    pH, POC UA 09/20/2023 6.5  5.0 - 8.0 Final    Protein, POC UA 09/20/2023 Negative  Negative Final    Urobilinogen, POC UA 09/20/2023 0.2  <=1.0 Final    Nitrite, POC UA 09/20/2023 Negative  Negative Final    WBC, POC UA 09/20/2023 Negative  Negative Final    Urine Culture, Routine 09/20/2023  (A)   Final                    Value:LACTOBACILLUS  SPECIES  10,000 - 49,999 cfu/ml      HPV other High Risk types, PCR 09/20/2023 Positive (A)  Negative Final    HPV High Risk type 16, PCR 09/20/2023 Negative  Negative Final    HPV High Risk type 18, PCR 09/20/2023 Negative  Negative Final    Final Pathologic Diagnosis 09/20/2023    Final                    Value:Specimen Adequacy  Satisfactory for interpretation. Endocervical component is present.    [place] Category  Negative for intraepithelial lesion or malignancy.      Disclaimer 09/20/2023    Final                    Value:The Pap smear is a screening test that aids in the detection of cervical cancer and cancer precursors. Both false positive and false negative results can occur. The test should be used at regular intervals, and positive results should be confirmed before   definitive therapy.  This liquid based specimen is processed using the  or [employer] Thin PrepPAP System. This specimen has been analyzed by the ThinPrep Imaging System ([employer]), an automated imaging and review system which assists the laboratory in evaluating   cells on ThinPrep PAP tests. Following automated imaging, selected fields from every slide are reviewed by a cytotechnologist and/or pathologist.     Screening was performed at [facility], [address] [place] [id number].         Past Medical History:   Diagnosis Date    GERD (gastroesophageal reflux disease)      History reviewed. No pertinent surgical history.  Family History   Problem Relation Age of Onset    Hypertension Mother     Kidney disease Mother     Cancer Maternal Grandmother         Lung CA       Marital Status: Single  Alcohol History:  reports current alcohol use of about 1.0 standard drink of alcohol per week.  Tobacco History:  reports that she has never smoked. She has never been exposed to tobacco smoke. She has never used smokeless tobacco.  Drug History:  reports no history of drug  "use.    Review of patient's allergies indicates:  No Known Allergies    Current Outpatient Medications:     pantoprazole (PROTONIX) 40 MG tablet, Take 1 tablet (40 mg total) by mouth once daily., Disp: 90 tablet, Rfl: 1    traZODone (DESYREL) 50 MG tablet, Take 1 tablet (50 mg total) by mouth every evening., Disp: 30 tablet, Rfl: 2    Review of Systems   Constitutional:  Negative for chills and fever.   HENT:  Negative for congestion and sore throat.    Eyes:  Negative for discharge and redness.   Respiratory:  Negative for cough and shortness of breath.    Cardiovascular:  Negative for chest pain, palpitations and leg swelling.   Gastrointestinal:  Positive for constipation. Negative for abdominal pain and vomiting.   Genitourinary:  Negative for dysuria.   Musculoskeletal:  Negative for arthralgias.   Skin:  Negative for color change.   Neurological:  Negative for headaches.   Psychiatric/Behavioral:  Negative for confusion and sleep disturbance.           Objective:      Vitals:    11/13/23 1154   BP: 110/76   Pulse: 78   SpO2: 99%   Weight: 79.8 kg (176 lb)   Height: 5' 5" (1.651 m)     Physical Exam  Constitutional:       General: She is not in acute distress.     Appearance: She is not toxic-appearing.   HENT:      Head: Normocephalic and atraumatic.      Nose: Nose normal.      Mouth/Throat:      Mouth: Mucous membranes are moist.      Pharynx: Oropharynx is clear.   Eyes:      Extraocular Movements: Extraocular movements intact.      Conjunctiva/sclera: Conjunctivae normal.      Pupils: Pupils are equal, round, and reactive to light.   Neck:      Thyroid: No thyroid mass.   Cardiovascular:      Rate and Rhythm: Normal rate and regular rhythm.      Pulses: Normal pulses.      Heart sounds: Normal heart sounds.   Pulmonary:      Effort: Pulmonary effort is normal. No respiratory distress.      Breath sounds: Normal breath sounds. No stridor. No wheezing, rhonchi or rales.   Abdominal:      General: There is no " distension.      Palpations: Abdomen is soft. There is no mass.      Tenderness: There is no abdominal tenderness.   Musculoskeletal:         General: Normal range of motion.      Right lower leg: No edema.      Left lower leg: No edema.   Skin:     General: Skin is warm and dry.   Neurological:      General: No focal deficit present.      Mental Status: She is alert and oriented to person, place, and time.           Assessment:       1. Primary insomnia    2. Constipation, unspecified constipation type    3. Icterus         Plan:       Primary insomnia  Comments:  Self-resolved. did not require treatment with trazodone long term. Doing MUCH better now.     Constipation, unspecified constipation type  Comments:  Recommended to take stimulant medication as needed.    Icterus  -     Comprehensive Metabolic Panel; Future; Expected date: 11/13/2023  -     CBC Auto Differential; Future; Expected date: 11/13/2023      Follow up in about 3 months (around 2/13/2024).        11/13/2023 Tomas Camacho PA-C

## 2023-11-15 LAB
ALBUMIN SERPL-MCNC: 4.3 G/DL (ref 3.6–5.1)
ALBUMIN/GLOB SERPL: 2 (CALC) (ref 1–2.5)
ALP SERPL-CCNC: 38 U/L (ref 31–125)
ALT SERPL-CCNC: 16 U/L (ref 6–29)
AST SERPL-CCNC: 25 U/L (ref 10–30)
BASOPHILS # BLD AUTO: 33 CELLS/UL (ref 0–200)
BASOPHILS NFR BLD AUTO: 0.5 %
BILIRUB SERPL-MCNC: 0.3 MG/DL (ref 0.2–1.2)
BUN SERPL-MCNC: 8 MG/DL (ref 7–25)
BUN/CREAT SERPL: NORMAL (CALC) (ref 6–22)
CALCIUM SERPL-MCNC: 10 MG/DL (ref 8.6–10.2)
CHLORIDE SERPL-SCNC: 102 MMOL/L (ref 98–110)
CO2 SERPL-SCNC: 31 MMOL/L (ref 20–32)
CREAT SERPL-MCNC: 0.78 MG/DL (ref 0.5–0.97)
EGFR: 103 ML/MIN/1.73M2
EOSINOPHIL # BLD AUTO: 99 CELLS/UL (ref 15–500)
EOSINOPHIL NFR BLD AUTO: 1.5 %
ERYTHROCYTE [DISTWIDTH] IN BLOOD BY AUTOMATED COUNT: 14.9 % (ref 11–15)
GLOBULIN SER CALC-MCNC: 2.2 G/DL (CALC) (ref 1.9–3.7)
GLUCOSE SERPL-MCNC: 86 MG/DL (ref 65–99)
HCT VFR BLD AUTO: 37.7 % (ref 35–45)
HGB BLD-MCNC: 11.6 G/DL (ref 11.7–15.5)
LYMPHOCYTES # BLD AUTO: 2640 CELLS/UL (ref 850–3900)
LYMPHOCYTES NFR BLD AUTO: 40 %
MCH RBC QN AUTO: 28.4 PG (ref 27–33)
MCHC RBC AUTO-ENTMCNC: 30.8 G/DL (ref 32–36)
MCV RBC AUTO: 92.4 FL (ref 80–100)
MONOCYTES # BLD AUTO: 416 CELLS/UL (ref 200–950)
MONOCYTES NFR BLD AUTO: 6.3 %
NEUTROPHILS # BLD AUTO: 3412 CELLS/UL (ref 1500–7800)
NEUTROPHILS NFR BLD AUTO: 51.7 %
PLATELET # BLD AUTO: 257 THOUSAND/UL (ref 140–400)
PMV BLD REES-ECKER: 13 FL (ref 7.5–12.5)
POTASSIUM SERPL-SCNC: 4.1 MMOL/L (ref 3.5–5.3)
PROT SERPL-MCNC: 6.5 G/DL (ref 6.1–8.1)
RBC # BLD AUTO: 4.08 MILLION/UL (ref 3.8–5.1)
SODIUM SERPL-SCNC: 141 MMOL/L (ref 135–146)
WBC # BLD AUTO: 6.6 THOUSAND/UL (ref 3.8–10.8)

## 2023-11-21 ENCOUNTER — PATIENT MESSAGE (OUTPATIENT)
Dept: FAMILY MEDICINE | Facility: CLINIC | Age: 33
End: 2023-11-21

## 2024-01-22 DIAGNOSIS — R30.0 DYSURIA: Primary | ICD-10-CM

## 2024-01-22 RX ORDER — SULFAMETHOXAZOLE AND TRIMETHOPRIM 800; 160 MG/1; MG/1
1 TABLET ORAL 2 TIMES DAILY
Qty: 20 TABLET | Refills: 0 | Status: SHIPPED | OUTPATIENT
Start: 2024-01-22 | End: 2024-02-26

## 2024-01-22 NOTE — TELEPHONE ENCOUNTER
Spoke to pt, offered 7am appt tomorrow but could not make it. Having dull low back pain and fatigue. Knows she had a slight UTI show on last UA, but was so few and she was asymptomatic we did not treat. Thinking she has something like that again and getting progressively worse

## 2024-01-22 NOTE — TELEPHONE ENCOUNTER
----- Message from Linnea Lomas sent at 1/22/2024  8:17 AM CST -----  The patient wants to be seen today. Lower back pain,headache, and very fatigue. She may have a UTI. PT'S # [phone] GH

## 2024-02-07 ENCOUNTER — TELEPHONE (OUTPATIENT)
Dept: FAMILY MEDICINE | Facility: CLINIC | Age: 34
End: 2024-02-07
Payer: OTHER GOVERNMENT

## 2024-02-07 DIAGNOSIS — Z00.00 ROUTINE PHYSICAL EXAMINATION: Primary | ICD-10-CM

## 2024-02-26 ENCOUNTER — OFFICE VISIT (OUTPATIENT)
Dept: FAMILY MEDICINE | Facility: CLINIC | Age: 34
End: 2024-02-26
Payer: OTHER GOVERNMENT

## 2024-02-26 VITALS
HEIGHT: 65 IN | SYSTOLIC BLOOD PRESSURE: 102 MMHG | BODY MASS INDEX: 29.82 KG/M2 | OXYGEN SATURATION: 99 % | WEIGHT: 179 LBS | HEART RATE: 74 BPM | DIASTOLIC BLOOD PRESSURE: 73 MMHG

## 2024-02-26 DIAGNOSIS — K21.9 GASTROESOPHAGEAL REFLUX DISEASE, UNSPECIFIED WHETHER ESOPHAGITIS PRESENT: Primary | ICD-10-CM

## 2024-02-26 DIAGNOSIS — D25.9 UTERINE LEIOMYOMA, UNSPECIFIED LOCATION: ICD-10-CM

## 2024-02-26 DIAGNOSIS — B37.31 VAGINAL YEAST INFECTION: ICD-10-CM

## 2024-02-26 DIAGNOSIS — Z11.3 SCREENING EXAMINATION FOR STD (SEXUALLY TRANSMITTED DISEASE): ICD-10-CM

## 2024-02-26 PROCEDURE — 99214 OFFICE O/P EST MOD 30 MIN: CPT | Mod: S$GLB,,, | Performed by: PHYSICIAN ASSISTANT

## 2024-02-26 RX ORDER — FLUCONAZOLE 150 MG/1
150 TABLET ORAL DAILY
Qty: 3 TABLET | Refills: 0 | Status: SHIPPED | OUTPATIENT
Start: 2024-02-26 | End: 2024-02-29

## 2024-02-26 NOTE — PROGRESS NOTES
SUBJECTIVE:    Patient ID: Estrella Corrales is a 33 y.o. female.    Chief Complaint: Follow-up (No bottles//no refills needed// 3 month follow up)    This is a 34 yo female with PMHx of GERD and fibroids, here for routine visit. Her only complaint today is mild vaginal itching, following taking bactrim for UTI. She has been doing well with sleep, states that she only has difficulty sleeping when she is stressed. Uses trazodone prn when she has difficulty falling asleep.     Follow-up  Associated symptoms include abdominal pain (sporadically, secondary to fibroids). Pertinent negatives include no arthralgias, chest pain, chills, congestion, coughing, fatigue, fever or headaches.       Office Visit on 11/13/2023   Component Date Value Ref Range Status    Glucose 11/13/2023 86  65 - 99 mg/dL Final    BUN 11/13/2023 8  7 - 25 mg/dL Final    Creatinine 11/13/2023 0.78  0.50 - 0.97 mg/dL Final    eGFR 11/13/2023 103  > OR = 60 mL/min/1.73m2 Final    BUN/Creatinine Ratio 11/13/2023 SEE NOTE:  6 - 22 (calc) Final    Sodium 11/13/2023 141  135 - 146 mmol/L Final    Potassium 11/13/2023 4.1  3.5 - 5.3 mmol/L Final    Chloride 11/13/2023 102  98 - 110 mmol/L Final    CO2 11/13/2023 31  20 - 32 mmol/L Final    Calcium 11/13/2023 10.0  8.6 - 10.2 mg/dL Final    Total Protein 11/13/2023 6.5  6.1 - 8.1 g/dL Final    Albumin 11/13/2023 4.3  3.6 - 5.1 g/dL Final    Globulin, Total 11/13/2023 2.2  1.9 - 3.7 g/dL (calc) Final    Albumin/Globulin Ratio 11/13/2023 2.0  1.0 - 2.5 (calc) Final    Total Bilirubin 11/13/2023 0.3  0.2 - 1.2 mg/dL Final    Alkaline Phosphatase 11/13/2023 38  31 - 125 U/L Final    AST 11/13/2023 25  10 - 30 U/L Final    ALT 11/13/2023 16  6 - 29 U/L Final    WBC 11/13/2023 6.6  3.8 - 10.8 Thousand/uL Final    RBC 11/13/2023 4.08  3.80 - 5.10 Million/uL Final    Hemoglobin 11/13/2023 11.6 (L)  11.7 - 15.5 g/dL Final    Hematocrit 11/13/2023 37.7  35.0 - 45.0 % Final    MCV 11/13/2023 92.4  80.0 - 100.0 fL  Final    MCH 11/13/2023 28.4  27.0 - 33.0 pg Final    MCHC 11/13/2023 30.8 (L)  32.0 - 36.0 g/dL Final    RDW 11/13/2023 14.9  11.0 - 15.0 % Final    Platelets 11/13/2023 257  140 - 400 Thousand/uL Final    MPV 11/13/2023 13.0 (H)  7.5 - 12.5 fL Final    Neutrophils, Abs 11/13/2023 3,412  1,500 - 7,800 cells/uL Final    Lymph # 11/13/2023 2,640  850 - 3,900 cells/uL Final    Mono # 11/13/2023 416  200 - 950 cells/uL Final    Eos # 11/13/2023 99  15 - 500 cells/uL Final    Baso # 11/13/2023 33  0 - 200 cells/uL Final    Neutrophils Relative 11/13/2023 51.7  % Final    Lymph % 11/13/2023 40.0  % Final    Mono % 11/13/2023 6.3  % Final    Eosinophil % 11/13/2023 1.5  % Final    Basophil % 11/13/2023 0.5  % Final   Office Visit on 09/20/2023   Component Date Value Ref Range Status    Color, POC UA 09/20/2023 Yellow  Yellow, Straw, Colorless Final    Clarity, POC UA 09/20/2023 Clear  Clear Final    Glucose, POC UA 09/20/2023 Negative  Negative Final    Bilirubin, POC UA 09/20/2023 Negative  Negative Final    Ketones, POC UA 09/20/2023 Negative  Negative Final    Spec Grav POC UA 09/20/2023 1.020  1.005 - 1.030 Final    Blood, POC UA 09/20/2023 Negative  Negative Final    pH, POC UA 09/20/2023 6.5  5.0 - 8.0 Final    Protein, POC UA 09/20/2023 Negative  Negative Final    Urobilinogen, POC UA 09/20/2023 0.2  <=1.0 Final    Nitrite, POC UA 09/20/2023 Negative  Negative Final    WBC, POC UA 09/20/2023 Negative  Negative Final    Urine Culture, Routine 09/20/2023  (A)   Final                    Value:LACTOBACILLUS SPECIES  10,000 - 49,999 cfu/ml      HPV other High Risk types, PCR 09/20/2023 Positive (A)  Negative Final    HPV High Risk type 16, PCR 09/20/2023 Negative  Negative Final    HPV High Risk type 18, PCR 09/20/2023 Negative  Negative Final    Final Pathologic Diagnosis 09/20/2023    Final                    Value:Specimen Adequacy  Satisfactory for interpretation. Endocervical component is present.    [place]  Category  Negative for intraepithelial lesion or malignancy.      Disclaimer 09/20/2023    Final                    Value:The Pap smear is a screening test that aids in the detection of cervical cancer and cancer precursors. Both false positive and false negative results can occur. The test should be used at regular intervals, and positive results should be confirmed before   definitive therapy.  This liquid based specimen is processed using the  or  Thin PrepPAP System. This specimen has been analyzed by the ThinPrep Imaging System ([employer]), an automated imaging and review system which assists the laboratory in evaluating   cells on ThinPrep PAP tests. Following automated imaging, selected fields from every slide are reviewed by a cytotechnologist and/or pathologist.     Screening was performed at [facility], [address], [place] [address].         Past Medical History:   Diagnosis Date    GERD (gastroesophageal reflux disease)      History reviewed. No pertinent surgical history.  Family History   Problem Relation Age of Onset    Hypertension Mother     Kidney disease Mother     Cancer Maternal Grandmother         Lung CA       Marital Status: Single  Alcohol History:  reports current alcohol use of about 1.0 standard drink of alcohol per week.  Tobacco History:  reports that she has never smoked. She has never been exposed to tobacco smoke. She has never used smokeless tobacco.  Drug History:  reports no history of drug use.    Review of patient's allergies indicates:  No Known Allergies    Current Outpatient Medications:     pantoprazole (PROTONIX) 40 MG tablet, Take 1 tablet (40 mg total) by mouth once daily., Disp: 90 tablet, Rfl: 1    fluconazole (DIFLUCAN) 150 MG Tab, Take 1 tablet (150 mg total) by mouth once daily. for 3 days, Disp: 3 tablet, Rfl: 0    Review of Systems   Constitutional:  Negative for activity change, appetite  "change, chills, fatigue and fever.   HENT:  Negative for congestion.    Respiratory:  Negative for cough, chest tightness, shortness of breath and wheezing.    Cardiovascular:  Negative for chest pain and palpitations.   Gastrointestinal:  Positive for abdominal pain (sporadically, secondary to fibroids). Negative for abdominal distention, constipation and diarrhea.   Genitourinary:  Negative for difficulty urinating and dysuria.   Musculoskeletal:  Negative for arthralgias.   Neurological:  Negative for dizziness, light-headedness and headaches.          Objective:      Vitals:    02/26/24 0850   BP: 102/73   Pulse: 74   SpO2: 99%   Weight: 81.2 kg (179 lb)   Height: 5' 5" (1.651 m)     Physical Exam  Constitutional:       Appearance: Normal appearance.   HENT:      Head: Normocephalic and atraumatic.   Eyes:      Extraocular Movements: Extraocular movements intact.      Pupils: Pupils are equal, round, and reactive to light.   Cardiovascular:      Rate and Rhythm: Normal rate and regular rhythm.      Pulses: Normal pulses.   Pulmonary:      Effort: Pulmonary effort is normal.      Breath sounds: Normal breath sounds.   Abdominal:      General: Abdomen is flat.      Palpations: Abdomen is soft.   Skin:     General: Skin is warm and dry.      Capillary Refill: Capillary refill takes less than 2 seconds.   Neurological:      Mental Status: She is alert.           Assessment:       1. Gastroesophageal reflux disease, unspecified whether esophagitis present    2. Screening examination for STD (sexually transmitted disease)    3. Vaginal yeast infection    4. Uterine leiomyoma, unspecified location         Plan:       Gastroesophageal reflux disease, unspecified whether esophagitis present  Comments:  Doing well on protonix    Screening examination for STD (sexually transmitted disease)  Comments:  will check routine panel.  Orders:  -     HIV 1/2 Ag/Ab (4th Gen); Future; Expected date: 02/26/2024  -     RPR; Future; " Expected date: 02/26/2024  -     SureSwab(R)Chlamydia/N.Gonorrhoeae RNA,TMA; Future; Expected date: 02/26/2024    Vaginal yeast infection  Comments:  likely from recent abx use. will treat now. recommend femmedophilus supplement to help  Orders:  -     fluconazole (DIFLUCAN) 150 MG Tab; Take 1 tablet (150 mg total) by mouth once daily. for 3 days  Dispense: 3 tablet; Refill: 0    Uterine leiomyoma, unspecified location  Comments:  present on u/s. still with some pressure. Pt wishes to observe. does not want to pursue any treatment at this time.      Follow up in about 6 months (around 8/26/2024).        2/26/2024 Tomas Camacho PA-C

## 2024-02-27 LAB
ALBUMIN SERPL-MCNC: 4.4 G/DL (ref 3.6–5.1)
ALBUMIN/GLOB SERPL: 1.6 (CALC) (ref 1–2.5)
ALP SERPL-CCNC: 49 U/L (ref 31–125)
ALT SERPL-CCNC: 37 U/L (ref 6–29)
APPEARANCE UR: CLEAR
AST SERPL-CCNC: 23 U/L (ref 10–30)
BACTERIA #/AREA URNS HPF: ABNORMAL /HPF
BACTERIA UR CULT: ABNORMAL
BASOPHILS # BLD AUTO: 30 CELLS/UL (ref 0–200)
BASOPHILS NFR BLD AUTO: 0.5 %
BILIRUB SERPL-MCNC: 0.4 MG/DL (ref 0.2–1.2)
BILIRUB UR QL STRIP: NEGATIVE
BUN SERPL-MCNC: 11 MG/DL (ref 7–25)
BUN/CREAT SERPL: ABNORMAL (CALC) (ref 6–22)
C TRACH RRNA SPEC QL NAA+PROBE: NOT DETECTED
CALCIUM SERPL-MCNC: 9.5 MG/DL (ref 8.6–10.2)
CHLORIDE SERPL-SCNC: 104 MMOL/L (ref 98–110)
CHOLEST SERPL-MCNC: 185 MG/DL
CHOLEST/HDLC SERPL: 2.8 (CALC)
CO2 SERPL-SCNC: 30 MMOL/L (ref 20–32)
COLOR UR: YELLOW
COMMENT: NORMAL
CREAT SERPL-MCNC: 0.88 MG/DL (ref 0.5–0.97)
EGFR: 89 ML/MIN/1.73M2
EOSINOPHIL # BLD AUTO: 142 CELLS/UL (ref 15–500)
EOSINOPHIL NFR BLD AUTO: 2.4 %
ERYTHROCYTE [DISTWIDTH] IN BLOOD BY AUTOMATED COUNT: 14.3 % (ref 11–15)
GLOBULIN SER CALC-MCNC: 2.7 G/DL (CALC) (ref 1.9–3.7)
GLUCOSE SERPL-MCNC: 85 MG/DL (ref 65–99)
GLUCOSE UR QL STRIP: NEGATIVE
HCT VFR BLD AUTO: 36.4 % (ref 35–45)
HDLC SERPL-MCNC: 66 MG/DL
HGB BLD-MCNC: 11.6 G/DL (ref 11.7–15.5)
HGB UR QL STRIP: NEGATIVE
HIV 1+2 AB+HIV1 P24 AG SERPL QL IA: NORMAL
HYALINE CASTS #/AREA URNS LPF: ABNORMAL /LPF
KETONES UR QL STRIP: NEGATIVE
LDLC SERPL CALC-MCNC: 102 MG/DL (CALC)
LEUKOCYTE ESTERASE UR QL STRIP: NEGATIVE
LYMPHOCYTES # BLD AUTO: 2254 CELLS/UL (ref 850–3900)
LYMPHOCYTES NFR BLD AUTO: 38.2 %
MCH RBC QN AUTO: 29.7 PG (ref 27–33)
MCHC RBC AUTO-ENTMCNC: 31.9 G/DL (ref 32–36)
MCV RBC AUTO: 93.1 FL (ref 80–100)
MONOCYTES # BLD AUTO: 448 CELLS/UL (ref 200–950)
MONOCYTES NFR BLD AUTO: 7.6 %
N GONORRHOEA RRNA SPEC QL NAA+PROBE: NOT DETECTED
NEUTROPHILS # BLD AUTO: 3027 CELLS/UL (ref 1500–7800)
NEUTROPHILS NFR BLD AUTO: 51.3 %
NITRITE UR QL STRIP: NEGATIVE
NONHDLC SERPL-MCNC: 119 MG/DL (CALC)
PH UR STRIP: 7.5 [PH] (ref 5–8)
PLATELET # BLD AUTO: 223 THOUSAND/UL (ref 140–400)
PMV BLD REES-ECKER: 11.8 FL (ref 7.5–12.5)
POTASSIUM SERPL-SCNC: 4.2 MMOL/L (ref 3.5–5.3)
PROT SERPL-MCNC: 7.1 G/DL (ref 6.1–8.1)
PROT UR QL STRIP: NEGATIVE
RBC # BLD AUTO: 3.91 MILLION/UL (ref 3.8–5.1)
RBC #/AREA URNS HPF: ABNORMAL /HPF
RPR SER QL: NORMAL
SERVICE CMNT-IMP: ABNORMAL
SODIUM SERPL-SCNC: 139 MMOL/L (ref 135–146)
SP GR UR STRIP: 1.02 (ref 1–1.03)
SQUAMOUS #/AREA URNS HPF: ABNORMAL /HPF
TRIGL SERPL-MCNC: 78 MG/DL
TSH SERPL-ACNC: 2.27 MIU/L
WBC # BLD AUTO: 5.9 THOUSAND/UL (ref 3.8–10.8)
WBC #/AREA URNS HPF: ABNORMAL /HPF

## 2024-03-01 ENCOUNTER — PATIENT MESSAGE (OUTPATIENT)
Dept: FAMILY MEDICINE | Facility: CLINIC | Age: 34
End: 2024-03-01
Payer: OTHER GOVERNMENT

## 2024-03-04 ENCOUNTER — PATIENT MESSAGE (OUTPATIENT)
Dept: FAMILY MEDICINE | Facility: CLINIC | Age: 34
End: 2024-03-04
Payer: OTHER GOVERNMENT

## 2024-06-04 ENCOUNTER — TELEPHONE (OUTPATIENT)
Dept: FAMILY MEDICINE | Facility: CLINIC | Age: 34
End: 2024-06-04
Payer: OTHER GOVERNMENT

## 2024-06-04 DIAGNOSIS — R74.8 ELEVATED LIVER ENZYMES: Primary | ICD-10-CM

## 2024-06-04 NOTE — TELEPHONE ENCOUNTER
----- Message from RT Yuniel sent at 6/4/2024  8:07 AM CDT -----  Regarding: FW: 3 month repeat labs    ----- Message -----  From: Ankita Fall LPN  Sent: 6/4/2024  12:00 AM CDT  To: Tomas Camacho Staff  Subject: 3 month repeat labs                              Follow up on LFT in 3 months

## 2024-06-19 ENCOUNTER — TELEPHONE (OUTPATIENT)
Dept: FAMILY MEDICINE | Facility: CLINIC | Age: 34
End: 2024-06-19
Payer: OTHER GOVERNMENT

## 2024-06-19 NOTE — TELEPHONE ENCOUNTER
Left mess that fasting lab is due to recheck liver enzymes and to call with any questions, order at [facility]. Updated remind me.

## 2024-07-26 ENCOUNTER — TELEPHONE (OUTPATIENT)
Dept: FAMILY MEDICINE | Facility: CLINIC | Age: 34
End: 2024-07-26
Payer: OTHER GOVERNMENT

## 2024-07-26 NOTE — TELEPHONE ENCOUNTER
----- Message from Lillie Cordon sent at 7/26/2024 10:17 AM CDT -----  Pt needs to reschedule her appointment for either Monday or Tuesday because she will be moving out of the state with the .    [phone]

## 2024-07-29 ENCOUNTER — OFFICE VISIT (OUTPATIENT)
Dept: FAMILY MEDICINE | Facility: CLINIC | Age: 34
End: 2024-07-29
Payer: OTHER GOVERNMENT

## 2024-07-29 VITALS
HEIGHT: 65 IN | WEIGHT: 187 LBS | OXYGEN SATURATION: 100 % | SYSTOLIC BLOOD PRESSURE: 112 MMHG | BODY MASS INDEX: 31.16 KG/M2 | DIASTOLIC BLOOD PRESSURE: 72 MMHG | RESPIRATION RATE: 18 BRPM | HEART RATE: 80 BPM

## 2024-07-29 DIAGNOSIS — D25.9 UTERINE LEIOMYOMA, UNSPECIFIED LOCATION: ICD-10-CM

## 2024-07-29 DIAGNOSIS — Z11.3 SCREENING FOR STD (SEXUALLY TRANSMITTED DISEASE): ICD-10-CM

## 2024-07-29 DIAGNOSIS — R30.0 DYSURIA: Primary | ICD-10-CM

## 2024-07-29 DIAGNOSIS — Z00.00 ROUTINE PHYSICAL EXAMINATION: ICD-10-CM

## 2024-07-29 DIAGNOSIS — N83.202 CYST OF LEFT OVARY: ICD-10-CM

## 2024-07-29 LAB
BILIRUB UR QL STRIP: NEGATIVE
GLUCOSE UR QL STRIP: NEGATIVE
KETONES UR QL STRIP: NEGATIVE
LEUKOCYTE ESTERASE UR QL STRIP: NEGATIVE
PH, POC UA: 6
POC BLOOD, URINE: NEGATIVE
POC NITRATES, URINE: NEGATIVE
PROT UR QL STRIP: NEGATIVE
SP GR UR STRIP: 1.01 (ref 1–1.03)
UROBILINOGEN UR STRIP-ACNC: NORMAL (ref 0.1–1.1)

## 2024-07-29 PROCEDURE — 99214 OFFICE O/P EST MOD 30 MIN: CPT | Mod: S$GLB,,, | Performed by: PHYSICIAN ASSISTANT

## 2024-07-29 PROCEDURE — 81003 URINALYSIS AUTO W/O SCOPE: CPT | Mod: QW,S$GLB,, | Performed by: PHYSICIAN ASSISTANT

## 2024-07-29 NOTE — PROGRESS NOTES
SUBJECTIVE:    Patient ID: Estrella Corrales is a 33 y.o. female.    Chief Complaint: Follow-up (No bottles// no refills needed// pt states she's been having lower back pain,abdominal pain and very fatigue going on for 1 week states she think it uti no discharging no odor she did have burning sensation 3 days after symptoms started //pt is also requesting to be tested for STD)    33-year-old female presents today for regular follow-up.  Reports lower back pain, abdominal pain and fatigue ongoing for the past week.  Suspects that she may have UTI present as symptoms have been similar to this in the past.  She is also having burning with urination.  STD testing requested. She takes probiotic/prebiotic and pushing plenty of clear liquids. Hx of ovarian cyst and uterine fibroids. LLQ pain possibly. She is midway through the cycle.    Dysuria   This is a recurrent problem. The current episode started in the past 7 days. The problem occurs intermittently. The problem has been waxing and waning. The quality of the pain is described as aching, shooting and stabbing. The pain is at a severity of 8/10. The pain is moderate. There has been no fever. She is Sexually active. There is No history of pyelonephritis. Associated symptoms include flank pain, frequency, nausea, urgency and constipation. Pertinent negatives include no behavior changes, chills, discharge, hematuria, hesitancy, possible pregnancy, sweats, vomiting, weight loss, rash or withholding. She has tried increased fluids for the symptoms. The treatment provided no relief. Her past medical history is significant for recurrent UTIs. There is no history of catheterization, diabetes insipidus, diabetes mellitus, genitourinary reflux, hypertension, kidney stones, a single kidney, STD, urinary stasis or a urological procedure.       Telephone on 06/04/2024   Component Date Value Ref Range Status    Glucose 06/28/2024 81  65 - 99 mg/dL Final    BUN 06/28/2024 10  7 - 25  mg/dL Final    Creatinine 06/28/2024 0.88  0.50 - 0.97 mg/dL Final    eGFR 06/28/2024 89  > OR = 60 mL/min/1.73m2 Final    BUN/Creatinine Ratio 06/28/2024 SEE NOTE:  6 - 22 (calc) Final    Sodium 06/28/2024 140  135 - 146 mmol/L Final    Potassium 06/28/2024 4.2  3.5 - 5.3 mmol/L Final    Chloride 06/28/2024 102  98 - 110 mmol/L Final    CO2 06/28/2024 29  20 - 32 mmol/L Final    Calcium 06/28/2024 9.7  8.6 - 10.2 mg/dL Final    Total Protein 06/28/2024 6.9  6.1 - 8.1 g/dL Final    Albumin 06/28/2024 4.4  3.6 - 5.1 g/dL Final    Globulin, Total 06/28/2024 2.5  1.9 - 3.7 g/dL (calc) Final    Albumin/Globulin Ratio 06/28/2024 1.8  1.0 - 2.5 (calc) Final    Total Bilirubin 06/28/2024 0.7  0.2 - 1.2 mg/dL Final    Alkaline Phosphatase 06/28/2024 67  31 - 125 U/L Final    AST 06/28/2024 22  10 - 30 U/L Final    ALT 06/28/2024 15  6 - 29 U/L Final   Office Visit on 02/26/2024   Component Date Value Ref Range Status    HIV Ag/Ab 4th Gen 02/26/2024 NON-REACTIVE  NON-REACTIVE Final    Chlamydia Trachomatis RNA, TMA, Ur* 02/26/2024 NOT DETECTED  NOT DETECTED Final    Neisseria Gonorrhoeae RNA, TMA, Ur* 02/26/2024 NOT DETECTED  NOT DETECTED Final    Comment 02/26/2024    Final    RPR (Dx) w/Refl Titer and Confirma* 02/26/2024 NON-REACTIVE  NON-REACTIVE Final   Telephone on 02/07/2024   Component Date Value Ref Range Status    Color, UA 02/26/2024 YELLOW  YELLOW Final    Appearance, UA 02/26/2024 CLEAR  CLEAR Final    Specific Gravity, UA 02/26/2024 1.024  1.001 - 1.035 Final    pH, UA 02/26/2024 7.5  5.0 - 8.0 Final    Glucose, UA 02/26/2024 NEGATIVE  NEGATIVE Final    Bilirubin, UA 02/26/2024 NEGATIVE  NEGATIVE Final    Ketones, UA 02/26/2024 NEGATIVE  NEGATIVE Final    Occult Blood UA 02/26/2024 NEGATIVE  NEGATIVE Final    Protein, UA 02/26/2024 NEGATIVE  NEGATIVE Final    Nitrite, UA 02/26/2024 NEGATIVE  NEGATIVE Final    Leukocytes, UA 02/26/2024 NEGATIVE  NEGATIVE Final    WBC Casts, UA 02/26/2024 NONE SEEN  < OR = 5 /HPF  Final    RBC Casts, UA 02/26/2024 NONE SEEN  < OR = 2 /HPF Final    Squam Epithel, UA 02/26/2024 6-10 (A)  < OR = 5 /HPF Final    Bacteria, UA 02/26/2024 NONE SEEN  NONE SEEN /HPF Final    Hyaline Casts, UA 02/26/2024 NONE SEEN  NONE SEEN /LPF Final    Service Cmt: 02/26/2024    Final    Reflexive Urine Culture 02/26/2024    Final    TSH w/reflex to FT4 02/26/2024 2.27  mIU/L Final    Cholesterol 02/26/2024 185  <200 mg/dL Final    HDL 02/26/2024 66  > OR = 50 mg/dL Final    Triglycerides 02/26/2024 78  <150 mg/dL Final    LDL Cholesterol 02/26/2024 102 (H)  mg/dL (calc) Final    HDL/Cholesterol Ratio 02/26/2024 2.8  <5.0 (calc) Final    Non HDL Chol. (LDL+VLDL) 02/26/2024 119  <130 mg/dL (calc) Final    WBC 02/26/2024 5.9  3.8 - 10.8 Thousand/uL Final    RBC 02/26/2024 3.91  3.80 - 5.10 Million/uL Final    Hemoglobin 02/26/2024 11.6 (L)  11.7 - 15.5 g/dL Final    Hematocrit 02/26/2024 36.4  35.0 - 45.0 % Final    MCV 02/26/2024 93.1  80.0 - 100.0 fL Final    MCH 02/26/2024 29.7  27.0 - 33.0 pg Final    MCHC 02/26/2024 31.9 (L)  32.0 - 36.0 g/dL Final    RDW 02/26/2024 14.3  11.0 - 15.0 % Final    Platelets 02/26/2024 223  140 - 400 Thousand/uL Final    MPV 02/26/2024 11.8  7.5 - 12.5 fL Final    Neutrophils, Abs 02/26/2024 3,027  1,500 - 7,800 cells/uL Final    Lymph # 02/26/2024 2,254  850 - 3,900 cells/uL Final    Mono # 02/26/2024 448  200 - 950 cells/uL Final    Eos # 02/26/2024 142  15 - 500 cells/uL Final    Baso # 02/26/2024 30  0 - 200 cells/uL Final    Neutrophils Relative 02/26/2024 51.3  % Final    Lymph % 02/26/2024 38.2  % Final    Mono % 02/26/2024 7.6  % Final    Eosinophil % 02/26/2024 2.4  % Final    Basophil % 02/26/2024 0.5  % Final    Glucose 02/26/2024 85  65 - 99 mg/dL Final    BUN 02/26/2024 11  7 - 25 mg/dL Final    Creatinine 02/26/2024 0.88  0.50 - 0.97 mg/dL Final    eGFR 02/26/2024 89  > OR = 60 mL/min/1.73m2 Final    BUN/Creatinine Ratio 02/26/2024 SEE NOTE:  6 - 22 (calc) Final     "Sodium 02/26/2024 139  135 - 146 mmol/L Final    Potassium 02/26/2024 4.2  3.5 - 5.3 mmol/L Final    Chloride 02/26/2024 104  98 - 110 mmol/L Final    CO2 02/26/2024 30  20 - 32 mmol/L Final    Calcium 02/26/2024 9.5  8.6 - 10.2 mg/dL Final    Total Protein 02/26/2024 7.1  6.1 - 8.1 g/dL Final    Albumin 02/26/2024 4.4  3.6 - 5.1 g/dL Final    Globulin, Total 02/26/2024 2.7  1.9 - 3.7 g/dL (calc) Final    Albumin/Globulin Ratio 02/26/2024 1.6  1.0 - 2.5 (calc) Final    Total Bilirubin 02/26/2024 0.4  0.2 - 1.2 mg/dL Final    Alkaline Phosphatase 02/26/2024 49  31 - 125 U/L Final    AST 02/26/2024 23  10 - 30 U/L Final    ALT 02/26/2024 37 (H)  6 - 29 U/L Final       Past Medical History:   Diagnosis Date    GERD (gastroesophageal reflux disease)      History reviewed. No pertinent surgical history.  Family History   Problem Relation Name Age of Onset    Hypertension Mother      Kidney disease Mother      Cancer Maternal Grandmother          Lung CA       Marital Status: Single  Alcohol History:  reports current alcohol use of about 1.0 standard drink of alcohol per week.  Tobacco History:  reports that she has never smoked. She has never been exposed to tobacco smoke. She has never used smokeless tobacco.  Drug History:  reports no history of drug use.    Review of patient's allergies indicates:  No Known Allergies    Current Outpatient Medications:     pantoprazole (PROTONIX) 40 MG tablet, Take 1 tablet (40 mg total) by mouth once daily., Disp: 90 tablet, Rfl: 1    Review of Systems   Constitutional:  Negative for chills and weight loss.   Gastrointestinal:  Positive for constipation and nausea. Negative for vomiting.   Genitourinary:  Positive for dysuria, flank pain, frequency and urgency. Negative for hematuria and hesitancy.   Skin:  Negative for rash.          Objective:      Vitals:    07/29/24 0729   BP: 112/72   Pulse: 80   Resp: 18   SpO2: 100%   Weight: 84.8 kg (187 lb)   Height: 5' 5" (1.651 m) "     Physical Exam  Constitutional:       Appearance: Normal appearance.   HENT:      Head: Normocephalic and atraumatic.   Eyes:      Extraocular Movements: Extraocular movements intact.      Pupils: Pupils are equal, round, and reactive to light.   Cardiovascular:      Rate and Rhythm: Normal rate and regular rhythm.      Pulses: Normal pulses.   Pulmonary:      Effort: Pulmonary effort is normal.      Breath sounds: Normal breath sounds.   Abdominal:      General: Abdomen is flat.      Palpations: Abdomen is soft.   Skin:     General: Skin is warm and dry.      Capillary Refill: Capillary refill takes less than 2 seconds.   Neurological:      Mental Status: She is alert.           Assessment:       1. Dysuria    2. Uterine leiomyoma, unspecified location    3. Cyst of left ovary    4. Routine physical examination    5. Screening for STD (sexually transmitted disease)         Plan:       Dysuria  Comments:  unclear etiology but given constellation of sxs will send for culture to confirm.  Orders:  -     POCT Urinalysis, Dipstick, Automated, W/O Scope  -     HIV 1/2 Ag/Ab (4th Gen); Future; Expected date: 07/29/2024  -     RPR w/Rflx Titer; Future; Expected date: 07/29/2024  -     SureSwab(R)Chlamydia/N.Gonorrhoeae RNA,TMA; Future; Expected date: 07/29/2024  -     CULTURE, URINE    Uterine leiomyoma, unspecified location    Cyst of left ovary  Comments:  possibly an ovarian cyst causing the LLQ pain. given timing. recommend otc supportive measures unless symptoms rapidly intensify.    Routine physical examination    Screening for STD (sexually transmitted disease)  Comments:  screening today.  Orders:  -     POCT Urinalysis, Dipstick, Automated, W/O Scope  -     HIV 1/2 Ag/Ab (4th Gen); Future; Expected date: 07/29/2024  -     RPR w/Rflx Titer; Future; Expected date: 07/29/2024  -     SureSwab(R)Chlamydia/N.Gonorrhoeae RNA,TMA; Future; Expected date: 07/29/2024  -     CULTURE, URINE      Follow up if symptoms worsen  or fail to improve.        7/29/2024 Tomas Camacho PA-C

## 2024-07-30 ENCOUNTER — PATIENT MESSAGE (OUTPATIENT)
Dept: FAMILY MEDICINE | Facility: CLINIC | Age: 34
End: 2024-07-30
Payer: OTHER GOVERNMENT

## 2024-07-30 LAB
HIV 1+2 AB+HIV1 P24 AG SERPL QL IA: NORMAL
RPR SER QL: NORMAL

## 2024-07-31 LAB — BACTERIA UR CULT: NORMAL

## 2025-08-20 ENCOUNTER — PATIENT MESSAGE (OUTPATIENT)
Dept: ADMINISTRATIVE | Facility: HOSPITAL | Age: 35
End: 2025-08-20